# Patient Record
Sex: MALE | Race: WHITE | NOT HISPANIC OR LATINO | Employment: FULL TIME | URBAN - METROPOLITAN AREA
[De-identification: names, ages, dates, MRNs, and addresses within clinical notes are randomized per-mention and may not be internally consistent; named-entity substitution may affect disease eponyms.]

---

## 2017-01-04 ENCOUNTER — OFFICE VISIT (OUTPATIENT)
Dept: NEUROSURGERY | Facility: CLINIC | Age: 37
End: 2017-01-04

## 2017-01-04 VITALS
BODY MASS INDEX: 27.13 KG/M2 | WEIGHT: 179 LBS | DIASTOLIC BLOOD PRESSURE: 64 MMHG | SYSTOLIC BLOOD PRESSURE: 116 MMHG | HEIGHT: 68 IN

## 2017-01-04 DIAGNOSIS — M54.16 LUMBAR RADICULOPATHY: Primary | ICD-10-CM

## 2017-01-04 PROCEDURE — 99213 OFFICE O/P EST LOW 20 MIN: CPT | Performed by: NEUROLOGICAL SURGERY

## 2017-01-04 RX ORDER — GABAPENTIN 300 MG/1
300 CAPSULE ORAL 3 TIMES DAILY
Qty: 120 CAPSULE | Refills: 3 | Status: SHIPPED | OUTPATIENT
Start: 2017-01-04 | End: 2018-05-01

## 2017-01-04 NOTE — PROGRESS NOTES
Subjective   Patient ID: Vince Lynne is a 36 y.o. male who is here today for follow-up for low back pain. He is unaccompanied for this visit today.    History of Present Illness  Patient reports taking the gabapentin since his last visit.  He was able to start jogging last week.  He is pleased with his progress.  No new symptoms or side effects.  Less leg pain.      The following portions of the patient's history were reviewed and updated as appropriate: allergies, current medications, past medical history, past social history, past surgical history and problem list.    Review of Systems   Musculoskeletal: Negative for back pain.   Neurological: Positive for numbness ( LLE ).   Psychiatric/Behavioral: Negative for sleep disturbance.       Objective   Physical Exam  Neurologic Exam   Physical Exam   Neurological:    Reflex Scores:  Patellar reflexes are 1+ on the right side and 1+ on the left side.  Achilles reflexes are 0 on the right side and 0 on the left side.     Neurologic Exam      Motor Exam   Muscle bulk: normal     Strength    Right iliopsoas: 5/5  Left iliopsoas: 5/5  Right quadriceps: 5/5  Left quadriceps: 5/5  Right hamstrin/5  Left hamstrin/5  Right anterior tibial: 5/5  Left anterior tibial: 5/5  Right gastroc: 5/5  Left gastroc: 5/5  ehl is 5/5      Sensory Exam   Right leg light touch: normal  Left leg light touch: normal  Right leg pinprick: normal  Left leg pinprick: normal     Gait, Coordination, and Reflexes      Gait  Gait: (cannot heel walk on the left)     Reflexes   Right patellar: 1+  Left patellar: 1+  Right achilles: 0  Left achilles: 0  Right ankle clonus: absent  Left ankle clonus: absent    Assessment/Plan   Independent Review of Radiographic Studies:    Patient has a small canal congenitally. He has a small left L45 disc bulge and some far lateral component as well.   Medical Decision Making:    He feels much better.  His weakness is improved.  He is trending toward  improvement.  Will continue gabapentin for at least 3 months.  I refilled this today.  I counselled him w/r/t gabapentin and his improvement.  WIll hold on surgery for now.    Vince was seen today for back pain.    Diagnoses and all orders for this visit:    Lumbar radiculopathy    Other orders  -     gabapentin (NEURONTIN) 300 MG capsule; Take 1 capsule by mouth 3 (Three) Times a Day. Take 1 qhs for 3-5 days, then bid for 3-5 days, then tid and stay on this dose    No Follow-up on file.

## 2017-01-04 NOTE — MR AVS SNAPSHOT
Vince Lynne   1/4/2017 3:15 PM   Office Visit    Dept Phone:  [phone]   Encounter #:  [id number]    Provider:  Maximilian Kimbrough IV, MD   Department:  [facility]                Your Full Care Plan              Today's Medication Changes          These changes are accurate as of: 1/4/17  3:53 PM.  If you have any questions, ask your nurse or doctor.               Medication(s)that have changed:     gabapentin 300 MG capsule   Commonly known as:  NEURONTIN   Take 1 capsule by mouth 3 (Three) Times a Day. Take 1 qhs for 3-5 days, then bid for 3-5 days, then tid and stay on this dose   What changed:    - how much to take  - how to take this  - when to take this   Changed by:  Maximilian Kimbrough IV, MD         Stop taking medication(s)listed here:     TYLENOL COLD HEAD CONGESTION 10-5-325 MG tablet   Generic drug:  DM-Phenylephrine-Acetaminophen   Stopped by:  Maximilian Kimbrough IV, MD                Where to Get Your Medications      These medications were sent to [id number] - [place], [place] - [address] - [phone]  - [phone]   [address] [address]     Phone:  [phone]     gabapentin 300 MG capsule                  Your Updated Medication List          This list is accurate as of: 1/4/17  3:53 PM.  Always use your most recent med list.                gabapentin 300 MG capsule   Commonly known as:  NEURONTIN   Take 1 capsule by mouth 3 (Three) Times a Day. Take 1 qhs for 3-5 days, then bid for 3-5 days, then tid and stay on this dose       traZODone 100 MG tablet   Commonly known as:  DESYREL   Take 0.5-2 tabs QHS prn       zolpidem CR 12.5 MG CR tablet   Commonly known as:  AMBIEN CR   Take 1 tablet by mouth At Night As Needed for sleep.               You Were Diagnosed With        Codes Comments    Lumbar radiculopathy    -  Primary ICD-10-CM: M54.16  ICD-9-CM: 724.4       Instructions     None    "Patient Instructions History      Upcoming Appointments     Visit Type Date Time Department    OFFICE VISIT 2017  3:15 PM [facility]    OFFICE VISIT 2/15/2017  8:30 AM [facility]      MyChart Signup     [facility] [employer] allows you to send messages to your doctor, view your test results, renew your prescriptions, schedule appointments, and more. To sign up, go to [employer] and click on the Sign Up Now link in the New User? box. Enter your [employer] Activation Code exactly as it appears below along with the last four digits of your Social Security Number and your Date of Birth () to complete the sign-up process. If you do not sign up before the expiration date, you must request a new code.    [employer] Activation Code: [email or web address]  Expires: 2017  3:51 PM    If you have questions, you can email [email or web address] or call [phone] to talk to our [employer] staff. Remember, [employer] is NOT to be used for urgent needs. For medical emergencies, dial 911.               Other Info from Your Visit           Your Appointments     Feb 15, 2017  8:30 AM EST   Office Visit with Maximilian Kimbrough IV, MD   [facility]OS[facility] (--)    [address]  [facility] [id number] [phone]           Arrive 15 minutes prior to appointment.              Allergies     No Known Allergies      Reason for Visit     Back Pain           Vital Signs     Blood Pressure Height Weight Body Mass Index Smoking Status       116/64 (BP Location: Left arm, Patient Position: Sitting, Cuff Size: Adult) 68\" (172.7 cm) 179 lb (81.2 kg) 27.22 kg/m2 Never Smoker       Problems and Diagnoses Noted     Lumbar nerve root disorder        "

## 2017-02-15 ENCOUNTER — OFFICE VISIT (OUTPATIENT)
Dept: FAMILY MEDICINE CLINIC | Facility: CLINIC | Age: 37
End: 2017-02-15

## 2017-02-15 VITALS
HEART RATE: 67 BPM | SYSTOLIC BLOOD PRESSURE: 107 MMHG | DIASTOLIC BLOOD PRESSURE: 74 MMHG | BODY MASS INDEX: 27.62 KG/M2 | WEIGHT: 176 LBS | HEIGHT: 67 IN | RESPIRATION RATE: 14 BRPM | TEMPERATURE: 98 F

## 2017-02-15 DIAGNOSIS — F51.01 PRIMARY INSOMNIA: ICD-10-CM

## 2017-02-15 PROCEDURE — 99212 OFFICE O/P EST SF 10 MIN: CPT | Performed by: FAMILY MEDICINE

## 2017-02-15 RX ORDER — ZOLPIDEM TARTRATE 12.5 MG/1
12.5 TABLET, FILM COATED, EXTENDED RELEASE ORAL NIGHTLY PRN
Qty: 30 TABLET | Refills: 2
Start: 2017-02-15 | End: 2017-06-21 | Stop reason: SDUPTHER

## 2017-02-15 RX ORDER — TRAZODONE HYDROCHLORIDE 100 MG/1
TABLET ORAL
Qty: 60 TABLET | Refills: 5 | Status: SHIPPED | OUTPATIENT
Start: 2017-02-15 | End: 2017-06-21 | Stop reason: SDUPTHER

## 2017-02-15 NOTE — PROGRESS NOTES
"Subjective   Vince Lynne is a 36 y.o. male.     History of Present Illness     Chief Complaint:   Chief Complaint   Patient presents with   • Insomnia     [facility]       Vince Lynne 36 y.o. male who presents today for Medical Management of the below listed issues and medication refills.  he has a history of   Patient Active Problem List   Diagnosis   • Impaired fasting glucose   • Insomnia   • Irritable bowel syndrome   • Obstructive sleep apnea   • Lumbar radiculopathy   .  Since the last visit, he has overall felt well.  he has been compliant with   Current Outpatient Prescriptions:   •  traZODone (DESYREL) 100 MG tablet, Take 0.5-2 tabs QHS prn, Disp: 60 tablet, Rfl: 5  •  zolpidem CR (AMBIEN CR) 12.5 MG CR tablet, Take 1 tablet by mouth At Night As Needed for sleep., Disp: 30 tablet, Rfl: 2  •  gabapentin (NEURONTIN) 300 MG capsule, Take 1 capsule by mouth 3 (Three) Times a Day. Take 1 qhs for 3-5 days, then bid for 3-5 days, then tid and stay on this dose, Disp: 120 capsule, Rfl: 3.  he denies medication side effects.    All of the chronic condition(s) listed above are stable w/o issues.    Visit Vitals   • /74   • Pulse 67   • Temp 98 °F (36.7 °C) (Oral)   • Resp 14   • Ht 67\" (170.2 cm)   • Wt 176 lb (79.8 kg)   • BMI 27.57 kg/m2       No results found for this or any previous visit.      The following portions of the patient's history were reviewed and updated as appropriate: allergies, current medications, past family history, past medical history, past social history, past surgical history and problem list.    Review of Systems   Constitutional: Negative for activity change, chills, fatigue and fever.   Respiratory: Negative for cough and chest tightness.    Cardiovascular: Negative for chest pain and palpitations.   Gastrointestinal: Negative for abdominal pain and nausea.   Endocrine: Negative for cold intolerance and polydipsia.   Psychiatric/Behavioral: Negative for " behavioral problems and dysphoric mood.   All other systems reviewed and are negative.      Objective   Physical Exam   Constitutional: He appears well-developed and well-nourished.   Neck: Neck supple. No thyromegaly present.   Cardiovascular: Normal rate and regular rhythm.    No murmur heard.  Pulmonary/Chest: Effort normal and breath sounds normal.   Abdominal: Bowel sounds are normal.   Psychiatric: He has a normal mood and affect. His behavior is normal.   Nursing note and vitals reviewed.    The patient has read and signed the [facility] Controlled Substance Contract.  I will continue to see patient for regular follow up appointments.  They are well controlled on their medication.  RITA has been reviewed by me and is updated every 3 months. The patient is aware of the potential for addiction and dependence.    Assessment/Plan   Vince was seen today for insomnia.    Diagnoses and all orders for this visit:    Primary insomnia  -     zolpidem CR (AMBIEN CR) 12.5 MG CR tablet; Take 1 tablet by mouth At Night As Needed for sleep.  -     traZODone (DESYREL) 100 MG tablet; Take 0.5-2 tabs QHS prn

## 2017-06-21 ENCOUNTER — OFFICE VISIT (OUTPATIENT)
Dept: FAMILY MEDICINE CLINIC | Facility: CLINIC | Age: 37
End: 2017-06-21

## 2017-06-21 VITALS
BODY MASS INDEX: 26.37 KG/M2 | DIASTOLIC BLOOD PRESSURE: 68 MMHG | HEART RATE: 68 BPM | WEIGHT: 168 LBS | TEMPERATURE: 98.6 F | RESPIRATION RATE: 16 BRPM | HEIGHT: 67 IN | SYSTOLIC BLOOD PRESSURE: 103 MMHG

## 2017-06-21 DIAGNOSIS — F51.01 PRIMARY INSOMNIA: ICD-10-CM

## 2017-06-21 PROCEDURE — 99212 OFFICE O/P EST SF 10 MIN: CPT | Performed by: FAMILY MEDICINE

## 2017-06-21 RX ORDER — ZOLPIDEM TARTRATE 12.5 MG/1
12.5 TABLET, FILM COATED, EXTENDED RELEASE ORAL NIGHTLY PRN
Qty: 30 TABLET | Refills: 2
Start: 2017-06-21 | End: 2017-06-21 | Stop reason: SDUPTHER

## 2017-06-21 RX ORDER — TRAZODONE HYDROCHLORIDE 100 MG/1
TABLET ORAL
Qty: 60 TABLET | Refills: 5 | Status: SHIPPED | OUTPATIENT
Start: 2017-06-21 | End: 2017-10-31 | Stop reason: SDUPTHER

## 2017-06-21 RX ORDER — ZOLPIDEM TARTRATE 12.5 MG/1
12.5 TABLET, FILM COATED, EXTENDED RELEASE ORAL NIGHTLY PRN
Qty: 30 TABLET | Refills: 2 | Status: SHIPPED | OUTPATIENT
Start: 2017-06-21 | End: 2017-10-31 | Stop reason: SDUPTHER

## 2017-06-21 NOTE — PROGRESS NOTES
"Subjective   Vince Lynne is a 37 y.o. male.     History of Present Illness     Chief Complaint:   Chief Complaint   Patient presents with   • Insomnia     MED [facility] - [facility]       Vince Lynne 37 y.o. male who presents today for Medical Management of the below listed issues and medication refills.  he has a history of   Patient Active Problem List   Diagnosis   • Impaired fasting glucose   • Insomnia   • Irritable bowel syndrome   • Obstructive sleep apnea   • Lumbar radiculopathy   .  Since the last visit, he has overall felt well.  he has been compliant with   Current Outpatient Prescriptions:   •  traZODone (DESYREL) 100 MG tablet, Take 0.5-2 tabs QHS prn, Disp: 60 tablet, Rfl: 5  •  zolpidem CR (AMBIEN CR) 12.5 MG CR tablet, Take 1 tablet by mouth At Night As Needed for Sleep., Disp: 30 tablet, Rfl: 2  •  gabapentin (NEURONTIN) 300 MG capsule, Take 1 capsule by mouth 3 (Three) Times a Day. Take 1 qhs for 3-5 days, then bid for 3-5 days, then tid and stay on this dose, Disp: 120 capsule, Rfl: 3.  he denies medication side effects.    All of the chronic condition(s) listed above are stable w/o issues.    /68  Pulse 68  Temp 98.6 °F (37 °C) (Oral)   Resp 16  Ht 67\" (170.2 cm)  Wt 168 lb (76.2 kg)  BMI 26.31 kg/m2    No results found for this or any previous visit.      The following portions of the patient's history were reviewed and updated as appropriate: allergies, current medications, past family history, past medical history, past social history, past surgical history and problem list.    Review of Systems   Constitutional: Negative for activity change, chills, fatigue and fever.   Respiratory: Negative for cough and shortness of breath.    Cardiovascular: Negative for chest pain and palpitations.   Gastrointestinal: Negative for abdominal pain.   Endocrine: Negative for cold intolerance.   Psychiatric/Behavioral: Negative for behavioral problems and dysphoric mood. The patient is not " nervous/anxious.        Objective   Physical Exam   Constitutional: He appears well-developed and well-nourished.   Neck: Neck supple. No thyromegaly present.   Cardiovascular: Normal rate and regular rhythm.    No murmur heard.  Pulmonary/Chest: Effort normal and breath sounds normal.   Abdominal: Bowel sounds are normal.   Psychiatric: He has a normal mood and affect. His behavior is normal.   Nursing note and vitals reviewed.    The patient has read and signed the [facility] Controlled Substance Contract.  I will continue to see patient for regular follow up appointments.  They are well controlled on their medication.  RITA has been reviewed by me and is updated every 3 months. The patient is aware of the potential for addiction and dependence.    Assessment/Plan   Vince was seen today for insomnia.    Diagnoses and all orders for this visit:    Primary insomnia  -     Discontinue: zolpidem CR (AMBIEN CR) 12.5 MG CR tablet; Take 1 tablet by mouth At Night As Needed for Sleep.  -     zolpidem CR (AMBIEN CR) 12.5 MG CR tablet; Take 1 tablet by mouth At Night As Needed for Sleep.  -     traZODone (DESYREL) 100 MG tablet; Take 0.5-2 tabs QHS prn

## 2017-10-31 ENCOUNTER — OFFICE VISIT (OUTPATIENT)
Dept: FAMILY MEDICINE CLINIC | Facility: CLINIC | Age: 37
End: 2017-10-31

## 2017-10-31 VITALS
DIASTOLIC BLOOD PRESSURE: 72 MMHG | TEMPERATURE: 97.9 F | WEIGHT: 173 LBS | RESPIRATION RATE: 16 BRPM | SYSTOLIC BLOOD PRESSURE: 107 MMHG | HEIGHT: 67 IN | BODY MASS INDEX: 27.15 KG/M2 | HEART RATE: 62 BPM

## 2017-10-31 DIAGNOSIS — F51.01 PRIMARY INSOMNIA: ICD-10-CM

## 2017-10-31 PROCEDURE — 99212 OFFICE O/P EST SF 10 MIN: CPT | Performed by: FAMILY MEDICINE

## 2017-10-31 RX ORDER — ZOLPIDEM TARTRATE 12.5 MG/1
12.5 TABLET, FILM COATED, EXTENDED RELEASE ORAL NIGHTLY PRN
Qty: 30 TABLET | Refills: 2 | Status: SHIPPED | OUTPATIENT
Start: 2017-10-31 | End: 2018-02-22 | Stop reason: SDUPTHER

## 2017-10-31 RX ORDER — TRAZODONE HYDROCHLORIDE 100 MG/1
TABLET ORAL
Qty: 60 TABLET | Refills: 5 | Status: SHIPPED | OUTPATIENT
Start: 2017-10-31 | End: 2018-02-22 | Stop reason: SDUPTHER

## 2017-10-31 NOTE — PROGRESS NOTES
"Subjective   Vince Lynne is a 37 y.o. male.     History of Present Illness     Chief Complaint:   Chief Complaint   Patient presents with   • Insomnia     [facility]       Vince Lynne 37 y.o. male who presents today for Medical Management of the below listed issues and medication refills.  he has a problem list of   Patient Active Problem List   Diagnosis   • Impaired fasting glucose   • Insomnia   • Irritable bowel syndrome   • Obstructive sleep apnea   • Lumbar radiculopathy   .  Since the last visit, he has overall felt well.  he has been compliant with   Current Outpatient Prescriptions:   •  traZODone (DESYREL) 100 MG tablet, Take 0.5-2 tabs QHS prn, Disp: 60 tablet, Rfl: 5  •  zolpidem CR (AMBIEN CR) 12.5 MG CR tablet, Take 1 tablet by mouth At Night As Needed for Sleep., Disp: 30 tablet, Rfl: 2  •  gabapentin (NEURONTIN) 300 MG capsule, Take 1 capsule by mouth 3 (Three) Times a Day. Take 1 qhs for 3-5 days, then bid for 3-5 days, then tid and stay on this dose, Disp: 120 capsule, Rfl: 3.  he denies medication side effects.    All of the chronic condition(s) listed above are stable w/o issues.    /72  Pulse 62  Temp 97.9 °F (36.6 °C) (Oral)   Resp 16  Ht 67\" (170.2 cm)  Wt 173 lb (78.5 kg)  BMI 27.1 kg/m2    No results found for this or any previous visit.    The following portions of the patient's history were reviewed and updated as appropriate: allergies, current medications, past family history, past medical history, past social history, past surgical history and problem list.    Review of Systems   Constitutional: Negative for activity change, chills, fatigue and fever.   Respiratory: Negative for cough and shortness of breath.    Cardiovascular: Negative for chest pain and palpitations.   Gastrointestinal: Negative for abdominal pain.   Endocrine: Negative for cold intolerance.   Psychiatric/Behavioral: Negative for behavioral problems and dysphoric mood. The patient is " not nervous/anxious.        Objective   Physical Exam   Constitutional: He appears well-developed and well-nourished.   Neck: Neck supple. No thyromegaly present.   Cardiovascular: Normal rate and regular rhythm.    No murmur heard.  Pulmonary/Chest: Effort normal and breath sounds normal.   Abdominal: Bowel sounds are normal.   Psychiatric: He has a normal mood and affect. His behavior is normal.   Nursing note and vitals reviewed.    The patient has read and signed the [facility] Controlled Substance Contract.  I will continue to see patient for regular follow up appointments.  They are well controlled on their medication.  RITA has been reviewed by me and is updated every 3 months. The patient is aware of the potential for addiction and dependence.    Assessment/Plan   Vince was seen today for insomnia.    Diagnoses and all orders for this visit:    Primary insomnia  -     zolpidem CR (AMBIEN CR) 12.5 MG CR tablet; Take 1 tablet by mouth At Night As Needed for Sleep.  -     traZODone (DESYREL) 100 MG tablet; Take 0.5-2 tabs QHS prn

## 2018-02-22 ENCOUNTER — OFFICE VISIT (OUTPATIENT)
Dept: FAMILY MEDICINE CLINIC | Facility: CLINIC | Age: 38
End: 2018-02-22

## 2018-02-22 VITALS
BODY MASS INDEX: 26.68 KG/M2 | WEIGHT: 170 LBS | HEART RATE: 78 BPM | DIASTOLIC BLOOD PRESSURE: 77 MMHG | HEIGHT: 67 IN | RESPIRATION RATE: 16 BRPM | SYSTOLIC BLOOD PRESSURE: 110 MMHG | TEMPERATURE: 98.9 F

## 2018-02-22 DIAGNOSIS — F51.01 PRIMARY INSOMNIA: ICD-10-CM

## 2018-02-22 PROCEDURE — 99212 OFFICE O/P EST SF 10 MIN: CPT | Performed by: FAMILY MEDICINE

## 2018-02-22 RX ORDER — TRAZODONE HYDROCHLORIDE 100 MG/1
TABLET ORAL
Qty: 60 TABLET | Refills: 5 | Status: SHIPPED | OUTPATIENT
Start: 2018-02-22 | End: 2018-06-26 | Stop reason: SDUPTHER

## 2018-02-22 RX ORDER — ZOLPIDEM TARTRATE 12.5 MG/1
12.5 TABLET, FILM COATED, EXTENDED RELEASE ORAL NIGHTLY PRN
Qty: 30 TABLET | Refills: 2 | Status: SHIPPED | OUTPATIENT
Start: 2018-02-22 | End: 2018-06-26 | Stop reason: SDUPTHER

## 2018-02-22 NOTE — PROGRESS NOTES
"Subjective   Vince Lynne is a 37 y.o. male.     History of Present Illness     Chief Complaint:   Chief Complaint   Patient presents with   • Insomnia     MED REFILL - RITA        Vince Lynne 37 y.o. male who presents today for Medical Management of the below listed issues and medication refills.  he has a problem list of   Patient Active Problem List   Diagnosis   • Impaired fasting glucose   • Insomnia   • Irritable bowel syndrome   • Obstructive sleep apnea   • Lumbar radiculopathy   .  Since the last visit, he has overall felt well.  he has been compliant with   Current Outpatient Prescriptions:   •  traZODone (DESYREL) 100 MG tablet, Take 0.5-2 tabs QHS prn, Disp: 60 tablet, Rfl: 5  •  zolpidem CR (AMBIEN CR) 12.5 MG CR tablet, Take 1 tablet by mouth At Night As Needed for Sleep., Disp: 30 tablet, Rfl: 2  •  gabapentin (NEURONTIN) 300 MG capsule, Take 1 capsule by mouth 3 (Three) Times a Day. Take 1 qhs for 3-5 days, then bid for 3-5 days, then tid and stay on this dose, Disp: 120 capsule, Rfl: 3.  he denies medication side effects.    All of the chronic condition(s) listed above are stable w/o issues.    /77  Pulse 78  Temp 98.9 °F (37.2 °C) (Oral)   Resp 16  Ht 170.2 cm (67\")  Wt 77.1 kg (170 lb)  BMI 26.63 kg/m2    No results found for this or any previous visit.        The following portions of the patient's history were reviewed and updated as appropriate: allergies, current medications, past family history, past medical history, past social history, past surgical history and problem list.    Review of Systems   Constitutional: Negative for activity change, chills, fatigue and fever.   Respiratory: Negative for cough and shortness of breath.    Cardiovascular: Negative for chest pain and palpitations.   Gastrointestinal: Negative for abdominal pain.   Endocrine: Negative for cold intolerance.   Psychiatric/Behavioral: Negative for behavioral problems and dysphoric mood. The patient " is not nervous/anxious.        Objective   Physical Exam   Constitutional: He appears well-developed and well-nourished.   Neck: Neck supple. No thyromegaly present.   Cardiovascular: Normal rate and regular rhythm.    No murmur heard.  Pulmonary/Chest: Effort normal and breath sounds normal.   Abdominal: Bowel sounds are normal.   Psychiatric: He has a normal mood and affect. His behavior is normal.   Nursing note and vitals reviewed.    The patient has read and signed the [facility] Controlled Substance Contract.  I will continue to see patient for regular follow up appointments.  They are well controlled on their medication.  RITA has been reviewed by me and is updated every 3 months. The patient is aware of the potential for addiction and dependence.    Assessment/Plan   Vince was seen today for insomnia.    Diagnoses and all orders for this visit:    Primary insomnia  -     zolpidem CR (AMBIEN CR) 12.5 MG CR tablet; Take 1 tablet by mouth At Night As Needed for Sleep.  -     traZODone (DESYREL) 100 MG tablet; Take 0.5-2 tabs QHS prn

## 2018-05-01 ENCOUNTER — OFFICE VISIT (OUTPATIENT)
Dept: FAMILY MEDICINE CLINIC | Facility: CLINIC | Age: 38
End: 2018-05-01

## 2018-05-01 VITALS
HEART RATE: 68 BPM | SYSTOLIC BLOOD PRESSURE: 111 MMHG | DIASTOLIC BLOOD PRESSURE: 72 MMHG | TEMPERATURE: 98.2 F | WEIGHT: 169 LBS | BODY MASS INDEX: 26.53 KG/M2 | RESPIRATION RATE: 16 BRPM | HEIGHT: 67 IN

## 2018-05-01 DIAGNOSIS — K40.90 INGUINAL HERNIA OF LEFT SIDE WITHOUT OBSTRUCTION OR GANGRENE: Primary | ICD-10-CM

## 2018-05-01 PROCEDURE — 99213 OFFICE O/P EST LOW 20 MIN: CPT | Performed by: FAMILY MEDICINE

## 2018-05-01 NOTE — PROGRESS NOTES
"Subjective   Vince Lynne is a 37 y.o. male.     CC: Discharge from the Umbilicus          Groin Nodule    History of Present Illness     Pt comes in today c/o two things. First, he has some d/c from his umbilicus and secondly, has found a lump in the groin x 2 weeks. Denies pain with the left groin lump. No f/c. The umbilical d/c has resolved at this time and there is a slight protrusion there.      The following portions of the patient's history were reviewed and updated as appropriate: allergies, current medications, past family history, past medical history, past social history, past surgical history and problem list.    Review of Systems   Constitutional: Negative for activity change, chills, fatigue and fever.   Respiratory: Negative for cough and shortness of breath.    Cardiovascular: Negative for chest pain and palpitations.   Gastrointestinal: Negative for abdominal pain.        Umbilical d/c   Endocrine: Negative for cold intolerance.   Genitourinary:        Lump   Psychiatric/Behavioral: Negative for behavioral problems and dysphoric mood. The patient is not nervous/anxious.      /72   Pulse 68   Temp 98.2 °F (36.8 °C) (Oral)   Resp 16   Ht 170.2 cm (67\")   Wt 76.7 kg (169 lb)   BMI 26.47 kg/m²     Objective   Physical Exam   Constitutional: He appears well-developed and well-nourished.   Neck: Neck supple. No thyromegaly present.   Cardiovascular: Normal rate and regular rhythm.    No murmur heard.  Pulmonary/Chest: Effort normal and breath sounds normal.   Abdominal: Bowel sounds are normal. A hernia is present. Hernia confirmed positive in the left inguinal area (in canal and reducible).   Umbilicus WNL today.   Psychiatric: He has a normal mood and affect. His behavior is normal.   Nursing note and vitals reviewed.      Assessment/Plan   Vince was seen today for other and other.    Diagnoses and all orders for this visit:    Inguinal hernia of left side without obstruction or " gangrene  -     Ambulatory Referral to General Surgery

## 2018-05-23 ENCOUNTER — PREP FOR SURGERY (OUTPATIENT)
Dept: OTHER | Facility: HOSPITAL | Age: 38
End: 2018-05-23

## 2018-05-23 ENCOUNTER — OFFICE VISIT (OUTPATIENT)
Dept: SURGERY | Facility: CLINIC | Age: 38
End: 2018-05-23

## 2018-05-23 VITALS — HEART RATE: 58 BPM | HEIGHT: 67 IN | OXYGEN SATURATION: 95 % | WEIGHT: 169 LBS | BODY MASS INDEX: 26.53 KG/M2

## 2018-05-23 DIAGNOSIS — G47.33 OBSTRUCTIVE SLEEP APNEA: Primary | ICD-10-CM

## 2018-05-23 DIAGNOSIS — K40.90 LEFT INGUINAL HERNIA: ICD-10-CM

## 2018-05-23 DIAGNOSIS — K40.90 LEFT INGUINAL HERNIA: Primary | ICD-10-CM

## 2018-05-23 PROCEDURE — 99243 OFF/OP CNSLTJ NEW/EST LOW 30: CPT | Performed by: SURGERY

## 2018-05-23 RX ORDER — OXYCODONE HCL 10 MG/1
10 TABLET, FILM COATED, EXTENDED RELEASE ORAL ONCE
Status: CANCELLED | OUTPATIENT
Start: 2018-07-19 | End: 2018-07-19

## 2018-05-23 RX ORDER — ACETAMINOPHEN 325 MG/1
650 TABLET ORAL ONCE
Status: CANCELLED | OUTPATIENT
Start: 2018-07-19 | End: 2018-07-19

## 2018-05-23 RX ORDER — CELECOXIB 200 MG/1
200 CAPSULE ORAL ONCE
Status: CANCELLED | OUTPATIENT
Start: 2018-07-19 | End: 2018-07-19

## 2018-05-23 RX ORDER — CEFAZOLIN SODIUM 2 G/100ML
2 INJECTION, SOLUTION INTRAVENOUS ONCE
Status: CANCELLED | OUTPATIENT
Start: 2018-07-19 | End: 2018-07-19

## 2018-05-23 NOTE — PROGRESS NOTES
"SURGERY  Vince Lynne   1980 05/23/18    Chief Complaint:  Left inguinal hernia    HPI    Patient is a very pleasant 37 y.o. male who is referred by Dr. Harry Ortiz with a history of a golf ball-sized bump appearing in the left groin region.  He says there is actually no pain associated with it.  He is still working out, weight lifting routinely, Mondays Wednesdays Fridays, using dumbbell weights of up to 100 pounds.  He also has a 40 pound dog who can get up on the bed named \"Janice\", a mixed debridement, we is lifting to the bed and thinks that might be complicating the issue.  It's more protuberant when lifting, better when recumbent.    As far as his activity postoperatively, he is a  and when beating heavy lifting and would like to get back to work as soon as possible.  Of course he is interested in getting back to his workout routine.    He denies any testicular problems or urinary problems.  He does have sleep apnea, but hasn't used his CPAP for about 6 months.  I've asked that he get that operative and bring it the day of surgery.    Past Medical History:   Diagnosis Date   • Anxiety    • H/O complete eye exam due   • Impaired fasting glucose 06/30/2014   • Insomnia    • Irritable bowel syndrome 10/25/2011   • Obstructive sleep apnea 09/2015     Past Surgical History:   Procedure Laterality Date   • COLONOSCOPY N/A 02/17/2014    Normal ileum, normal colon, non-bleeding internal hemorrhoids-Dr. Edy Oviedo   • KNEE CARTILAGE SURGERY  2006   • UPPER GASTROINTESTINAL ENDOSCOPY N/A 02/17/2014    LA Grade B reflux esophagitis, normal stomach, normal examined duodenum-Dr. Edy Oviedo     Family History   Problem Relation Age of Onset   • Heart attack Mother    • Diabetes Father      Social History     Social History   • Marital status: Single     Spouse name: N/A   • Number of children: N/A   • Years of education: N/A     Occupational History   •       Social History " "Main Topics   • Smoking status: Never Smoker   • Smokeless tobacco: Former User   • Alcohol use 12.0 oz/week     20 Cans of beer per week   • Drug use: No   • Sexual activity: Defer     Other Topics Concern   • Not on file     Social History Narrative   • No narrative on file     Occupation/Additional Social Hx:       Current Outpatient Prescriptions:   •  Multiple Vitamin (MULTI-VITAMIN DAILY PO), Take  by mouth., Disp: , Rfl:   •  traZODone (DESYREL) 100 MG tablet, Take 0.5-2 tabs QHS prn, Disp: 60 tablet, Rfl: 5  •  zolpidem CR (AMBIEN CR) 12.5 MG CR tablet, Take 1 tablet by mouth At Night As Needed for Sleep., Disp: 30 tablet, Rfl: 2    No Known Allergies  Preventative Medicine  Colonoscopy: 2012  Review of Systems   HENT: Positive for rhinorrhea and sneezing.    All other systems reviewed and are negative.      Vitals:    05/23/18 1510   Pulse: 58   SpO2: 95%   Weight: 76.7 kg (169 lb)   Height: 170.2 cm (67\")       PHYSICAL EXAM:    Pulse 58   Ht 170.2 cm (67\")   Wt 76.7 kg (169 lb)   SpO2 95%   BMI 26.47 kg/m²   Body mass index is 26.47 kg/m².    Constitutional: well developed, well nourished, appears stated age  Eyes: sclera nonicteric, conjunctiva not injected   ENMT: Hearing intact, trachea midline, thyroid without masses  CVS: Bradycardic with regular rhythm, no murmur, peripheral edema not present  Respiratory: CTA, normal respiratory effort   Gastrointestinal: no hepatosplenomegaly, abdomen soft, nontender, abdominal hernia not present, incisional scars not present  Genitourinary: inguinal hernia visible at the left ring, about 3 cm, palpable, with the suggestion of one on the right side closer to the internal ring  Musculoskeletal: gait normal, muscle mass normal  Skin: warm and dry, no rashes visible  Neurological: awake and alert, seems to have reasonable capacity for understanding for medical decision making  Psychiatric: appears to have reasonable judgement, pleasant  Lymphatics: " no cervical adenopathy     Radiographic Findings: None    Lab Findings: None    Pamphlet reviewed: Inguinal hernia    IMPRESSION:  · Left inguinal hernia, with aggressive workout routine, with very possible right inguinal as well  · Sleep apnea, with intermittent compliance of CPAP  · Insomnia and anxiety, with trazodone and zolpidem use    PLAN:  · Laparoscopic left inguinal hernia repair, with possible right.  Discussed the risk and benefits including bleeding, infection, potential recurrence, testicular complications  · Bring CPAP the day of the procedure.  I discussed with him the particular reason that it's importance around the time of general anesthetic, where loss of airway can be more prone.  · Discussed in detail his return to activities, particularly in the context of his return to work, which I agreed he could go in a week if he wanted, the need to abstain from lifting his dog, as he is not to lift anything over 20 pounds for about 2 weeks.  He can get back to heavy work out with 100 pounds for 6 weeks.  Intermittent activities such as running could be started at 3 weeks.    Kylah Alvarez MD  05/23/18  6:15 PM

## 2018-06-26 ENCOUNTER — OFFICE VISIT (OUTPATIENT)
Dept: FAMILY MEDICINE CLINIC | Facility: CLINIC | Age: 38
End: 2018-06-26

## 2018-06-26 VITALS
HEART RATE: 66 BPM | WEIGHT: 167 LBS | RESPIRATION RATE: 14 BRPM | DIASTOLIC BLOOD PRESSURE: 77 MMHG | BODY MASS INDEX: 26.21 KG/M2 | TEMPERATURE: 97.9 F | HEIGHT: 67 IN | SYSTOLIC BLOOD PRESSURE: 118 MMHG

## 2018-06-26 DIAGNOSIS — F51.01 PRIMARY INSOMNIA: ICD-10-CM

## 2018-06-26 PROCEDURE — 99212 OFFICE O/P EST SF 10 MIN: CPT | Performed by: FAMILY MEDICINE

## 2018-06-26 RX ORDER — TRAZODONE HYDROCHLORIDE 100 MG/1
TABLET ORAL
Qty: 60 TABLET | Refills: 5 | Status: SHIPPED | OUTPATIENT
Start: 2018-06-26 | End: 2018-11-01 | Stop reason: SDUPTHER

## 2018-06-26 RX ORDER — ZOLPIDEM TARTRATE 12.5 MG/1
12.5 TABLET, FILM COATED, EXTENDED RELEASE ORAL NIGHTLY PRN
Qty: 30 TABLET | Refills: 2 | Status: SHIPPED | OUTPATIENT
Start: 2018-06-26 | End: 2018-11-01 | Stop reason: SDUPTHER

## 2018-06-26 NOTE — PROGRESS NOTES
"Subjective   Vince Lynne is a 38 y.o. male.     History of Present Illness     Chief Complaint:   Chief Complaint   Patient presents with   • Insomnia     med refill  rosemary       Vince Lynne 38 y.o. male who presents today for Medical Management of the below listed issues and medication refills.  he has a problem list of   Patient Active Problem List   Diagnosis   • Impaired fasting glucose   • Insomnia   • Irritable bowel syndrome   • Obstructive sleep apnea   • Lumbar radiculopathy   • Left inguinal hernia   .  Since the last visit, he has overall felt well.  he has been compliant with   Current Outpatient Prescriptions:   •  traZODone (DESYREL) 100 MG tablet, Take 0.5-2 tabs QHS prn, Disp: 60 tablet, Rfl: 5  •  zolpidem CR (AMBIEN CR) 12.5 MG CR tablet, Take 1 tablet by mouth At Night As Needed for Sleep., Disp: 30 tablet, Rfl: 2  •  Multiple Vitamin (MULTI-VITAMIN DAILY PO), Take  by mouth., Disp: , Rfl: .  he denies medication side effects.    All of the chronic condition(s) listed above are stable w/o issues.    /77   Pulse 66   Temp 97.9 °F (36.6 °C) (Oral)   Resp 14   Ht 170.2 cm (67\")   Wt 75.8 kg (167 lb)   BMI 26.16 kg/m²     No results found for this or any previous visit.        The following portions of the patient's history were reviewed and updated as appropriate: allergies, current medications, past family history, past medical history, past social history, past surgical history and problem list.    Review of Systems   Constitutional: Negative for activity change, chills, fatigue and fever.   Respiratory: Negative for cough and shortness of breath.    Cardiovascular: Negative for chest pain and palpitations.   Gastrointestinal: Negative for abdominal pain.   Endocrine: Negative for cold intolerance.   Psychiatric/Behavioral: Negative for behavioral problems and dysphoric mood. The patient is not nervous/anxious.        Objective   Physical Exam   Constitutional: He appears " well-developed and well-nourished.   Neck: Neck supple. No thyromegaly present.   Cardiovascular: Normal rate and regular rhythm.    No murmur heard.  Pulmonary/Chest: Effort normal and breath sounds normal.   Abdominal: Bowel sounds are normal. There is no tenderness.   Psychiatric: He has a normal mood and affect. His behavior is normal.   Nursing note and vitals reviewed.    The patient has read and signed the [facility] Controlled Substance Contract.  I will continue to see patient for regular follow up appointments.  They are well controlled on their medication.  RITA has been reviewed by me and is updated every 3 months. The patient is aware of the potential for addiction and dependence.    Assessment/Plan   Vince was seen today for insomnia.    Diagnoses and all orders for this visit:    Primary insomnia  -     zolpidem CR (AMBIEN CR) 12.5 MG CR tablet; Take 1 tablet by mouth At Night As Needed for Sleep.  -     traZODone (DESYREL) 100 MG tablet; Take 0.5-2 tabs QHS prn

## 2018-07-13 ENCOUNTER — APPOINTMENT (OUTPATIENT)
Dept: PREADMISSION TESTING | Facility: HOSPITAL | Age: 38
End: 2018-07-13

## 2018-07-13 VITALS
OXYGEN SATURATION: 99 % | DIASTOLIC BLOOD PRESSURE: 72 MMHG | HEART RATE: 77 BPM | TEMPERATURE: 97.2 F | RESPIRATION RATE: 18 BRPM | HEIGHT: 67 IN | BODY MASS INDEX: 26.06 KG/M2 | SYSTOLIC BLOOD PRESSURE: 114 MMHG | WEIGHT: 166 LBS

## 2018-07-13 LAB
ANION GAP SERPL CALCULATED.3IONS-SCNC: 14.6 MMOL/L
BUN BLD-MCNC: 22 MG/DL (ref 6–20)
BUN/CREAT SERPL: 18.3 (ref 7–25)
CALCIUM SPEC-SCNC: 9.7 MG/DL (ref 8.6–10.5)
CHLORIDE SERPL-SCNC: 103 MMOL/L (ref 98–107)
CO2 SERPL-SCNC: 24.4 MMOL/L (ref 22–29)
CREAT BLD-MCNC: 1.2 MG/DL (ref 0.76–1.27)
DEPRECATED RDW RBC AUTO: 47 FL (ref 37–54)
ERYTHROCYTE [DISTWIDTH] IN BLOOD BY AUTOMATED COUNT: 13.3 % (ref 11.5–14.5)
GFR SERPL CREATININE-BSD FRML MDRD: 67 ML/MIN/1.73
GLUCOSE BLD-MCNC: 136 MG/DL (ref 65–99)
HCT VFR BLD AUTO: 40.4 % (ref 40.4–52.2)
HGB BLD-MCNC: 13.8 G/DL (ref 13.7–17.6)
MCH RBC QN AUTO: 33.1 PG (ref 27–32.7)
MCHC RBC AUTO-ENTMCNC: 34.2 G/DL (ref 32.6–36.4)
MCV RBC AUTO: 96.9 FL (ref 79.8–96.2)
PLATELET # BLD AUTO: 154 10*3/MM3 (ref 140–500)
PMV BLD AUTO: 11.4 FL (ref 6–12)
POTASSIUM BLD-SCNC: 4.3 MMOL/L (ref 3.5–5.2)
RBC # BLD AUTO: 4.17 10*6/MM3 (ref 4.6–6)
SODIUM BLD-SCNC: 142 MMOL/L (ref 136–145)
WBC NRBC COR # BLD: 5.13 10*3/MM3 (ref 4.5–10.7)

## 2018-07-13 PROCEDURE — 93005 ELECTROCARDIOGRAM TRACING: CPT

## 2018-07-13 PROCEDURE — 80048 BASIC METABOLIC PNL TOTAL CA: CPT | Performed by: SURGERY

## 2018-07-13 PROCEDURE — 85027 COMPLETE CBC AUTOMATED: CPT | Performed by: SURGERY

## 2018-07-13 PROCEDURE — 93010 ELECTROCARDIOGRAM REPORT: CPT | Performed by: INTERNAL MEDICINE

## 2018-07-13 PROCEDURE — 36415 COLL VENOUS BLD VENIPUNCTURE: CPT

## 2018-07-19 ENCOUNTER — HOSPITAL ENCOUNTER (OUTPATIENT)
Facility: HOSPITAL | Age: 38
Setting detail: HOSPITAL OUTPATIENT SURGERY
Discharge: HOME | End: 2018-07-19
Attending: SURGERY | Admitting: SURGERY

## 2018-07-19 ENCOUNTER — ANESTHESIA EVENT (OUTPATIENT)
Dept: PERIOP | Facility: HOSPITAL | Age: 38
End: 2018-07-19

## 2018-07-19 ENCOUNTER — ANESTHESIA (OUTPATIENT)
Dept: PERIOP | Facility: HOSPITAL | Age: 38
End: 2018-07-19

## 2018-07-19 VITALS
TEMPERATURE: 97.7 F | RESPIRATION RATE: 18 BRPM | SYSTOLIC BLOOD PRESSURE: 112 MMHG | DIASTOLIC BLOOD PRESSURE: 64 MMHG | HEART RATE: 68 BPM | OXYGEN SATURATION: 96 %

## 2018-07-19 DIAGNOSIS — K40.90 LEFT INGUINAL HERNIA: ICD-10-CM

## 2018-07-19 PROCEDURE — 25010000002 MIDAZOLAM PER 1 MG: Performed by: ANESTHESIOLOGY

## 2018-07-19 PROCEDURE — 25010000002 DEXAMETHASONE PER 1 MG: Performed by: NURSE ANESTHETIST, CERTIFIED REGISTERED

## 2018-07-19 PROCEDURE — C1781 MESH (IMPLANTABLE): HCPCS | Performed by: SURGERY

## 2018-07-19 PROCEDURE — 25010000002 FENTANYL CITRATE (PF) 100 MCG/2ML SOLUTION: Performed by: NURSE ANESTHETIST, CERTIFIED REGISTERED

## 2018-07-19 PROCEDURE — 25010000002 ONDANSETRON PER 1 MG: Performed by: NURSE ANESTHETIST, CERTIFIED REGISTERED

## 2018-07-19 PROCEDURE — 25010000002 PROPOFOL 10 MG/ML EMULSION: Performed by: NURSE ANESTHETIST, CERTIFIED REGISTERED

## 2018-07-19 PROCEDURE — 49650 LAP ING HERNIA REPAIR INIT: CPT | Performed by: SURGERY

## 2018-07-19 PROCEDURE — 25010000002 PHENYLEPHRINE PER 1 ML: Performed by: NURSE ANESTHETIST, CERTIFIED REGISTERED

## 2018-07-19 PROCEDURE — 25010000003 CEFAZOLIN IN DEXTROSE 2-4 GM/100ML-% SOLUTION: Performed by: SURGERY

## 2018-07-19 DEVICE — BARD 3DMAX MESH RIGHT LARGE
Type: IMPLANTABLE DEVICE | Status: FUNCTIONAL
Brand: BARD 3DMAX MESH

## 2018-07-19 DEVICE — BARD 3DMAX MESH LEFT LARGE
Type: IMPLANTABLE DEVICE | Status: FUNCTIONAL
Brand: BARD 3DMAX MESH

## 2018-07-19 RX ORDER — HYDROMORPHONE HYDROCHLORIDE 1 MG/ML
0.5 INJECTION, SOLUTION INTRAMUSCULAR; INTRAVENOUS; SUBCUTANEOUS
Status: DISCONTINUED | OUTPATIENT
Start: 2018-07-19 | End: 2018-07-19 | Stop reason: HOSPADM

## 2018-07-19 RX ORDER — DEXAMETHASONE SODIUM PHOSPHATE 4 MG/ML
INJECTION, SOLUTION INTRA-ARTICULAR; INTRALESIONAL; INTRAMUSCULAR; INTRAVENOUS; SOFT TISSUE AS NEEDED
Status: DISCONTINUED | OUTPATIENT
Start: 2018-07-19 | End: 2018-07-19 | Stop reason: SURG

## 2018-07-19 RX ORDER — FENTANYL CITRATE 50 UG/ML
INJECTION, SOLUTION INTRAMUSCULAR; INTRAVENOUS AS NEEDED
Status: DISCONTINUED | OUTPATIENT
Start: 2018-07-19 | End: 2018-07-19 | Stop reason: SURG

## 2018-07-19 RX ORDER — FENTANYL CITRATE 50 UG/ML
50 INJECTION, SOLUTION INTRAMUSCULAR; INTRAVENOUS
Status: DISCONTINUED | OUTPATIENT
Start: 2018-07-19 | End: 2018-07-19 | Stop reason: HOSPADM

## 2018-07-19 RX ORDER — OXYCODONE AND ACETAMINOPHEN 7.5; 325 MG/1; MG/1
1 TABLET ORAL ONCE AS NEEDED
Status: COMPLETED | OUTPATIENT
Start: 2018-07-19 | End: 2018-07-19

## 2018-07-19 RX ORDER — MIDAZOLAM HYDROCHLORIDE 1 MG/ML
1 INJECTION INTRAMUSCULAR; INTRAVENOUS
Status: DISCONTINUED | OUTPATIENT
Start: 2018-07-19 | End: 2018-07-19 | Stop reason: HOSPADM

## 2018-07-19 RX ORDER — EPHEDRINE SULFATE 50 MG/ML
5 INJECTION, SOLUTION INTRAVENOUS ONCE AS NEEDED
Status: DISCONTINUED | OUTPATIENT
Start: 2018-07-19 | End: 2018-07-19 | Stop reason: HOSPADM

## 2018-07-19 RX ORDER — CELECOXIB 200 MG/1
200 CAPSULE ORAL ONCE
Status: COMPLETED | OUTPATIENT
Start: 2018-07-19 | End: 2018-07-19

## 2018-07-19 RX ORDER — LIDOCAINE HYDROCHLORIDE 20 MG/ML
INJECTION, SOLUTION INFILTRATION; PERINEURAL AS NEEDED
Status: DISCONTINUED | OUTPATIENT
Start: 2018-07-19 | End: 2018-07-19 | Stop reason: SURG

## 2018-07-19 RX ORDER — BUPIVACAINE HYDROCHLORIDE AND EPINEPHRINE 5; 5 MG/ML; UG/ML
INJECTION, SOLUTION PERINEURAL AS NEEDED
Status: DISCONTINUED | OUTPATIENT
Start: 2018-07-19 | End: 2018-07-19 | Stop reason: HOSPADM

## 2018-07-19 RX ORDER — ROCURONIUM BROMIDE 10 MG/ML
INJECTION, SOLUTION INTRAVENOUS AS NEEDED
Status: DISCONTINUED | OUTPATIENT
Start: 2018-07-19 | End: 2018-07-19 | Stop reason: SURG

## 2018-07-19 RX ORDER — PROMETHAZINE HYDROCHLORIDE 25 MG/1
12.5 TABLET ORAL ONCE AS NEEDED
Status: DISCONTINUED | OUTPATIENT
Start: 2018-07-19 | End: 2018-07-19 | Stop reason: HOSPADM

## 2018-07-19 RX ORDER — PROPOFOL 10 MG/ML
VIAL (ML) INTRAVENOUS AS NEEDED
Status: DISCONTINUED | OUTPATIENT
Start: 2018-07-19 | End: 2018-07-19 | Stop reason: SURG

## 2018-07-19 RX ORDER — CEFAZOLIN SODIUM 2 G/100ML
2 INJECTION, SOLUTION INTRAVENOUS ONCE
Status: COMPLETED | OUTPATIENT
Start: 2018-07-19 | End: 2018-07-19

## 2018-07-19 RX ORDER — ONDANSETRON 2 MG/ML
4 INJECTION INTRAMUSCULAR; INTRAVENOUS ONCE AS NEEDED
Status: DISCONTINUED | OUTPATIENT
Start: 2018-07-19 | End: 2018-07-19 | Stop reason: HOSPADM

## 2018-07-19 RX ORDER — PROMETHAZINE HYDROCHLORIDE 25 MG/1
25 SUPPOSITORY RECTAL ONCE AS NEEDED
Status: DISCONTINUED | OUTPATIENT
Start: 2018-07-19 | End: 2018-07-19 | Stop reason: HOSPADM

## 2018-07-19 RX ORDER — ONDANSETRON 4 MG/1
4 TABLET, FILM COATED ORAL EVERY 8 HOURS PRN
Qty: 20 TABLET | Refills: 0 | Status: SHIPPED | OUTPATIENT
Start: 2018-07-19 | End: 2018-07-26

## 2018-07-19 RX ORDER — DIPHENHYDRAMINE HYDROCHLORIDE 50 MG/ML
12.5 INJECTION INTRAMUSCULAR; INTRAVENOUS
Status: DISCONTINUED | OUTPATIENT
Start: 2018-07-19 | End: 2018-07-19 | Stop reason: HOSPADM

## 2018-07-19 RX ORDER — GLYCOPYRROLATE 0.2 MG/ML
INJECTION INTRAMUSCULAR; INTRAVENOUS AS NEEDED
Status: DISCONTINUED | OUTPATIENT
Start: 2018-07-19 | End: 2018-07-19 | Stop reason: SURG

## 2018-07-19 RX ORDER — PROMETHAZINE HYDROCHLORIDE 25 MG/1
25 TABLET ORAL ONCE AS NEEDED
Status: DISCONTINUED | OUTPATIENT
Start: 2018-07-19 | End: 2018-07-19 | Stop reason: HOSPADM

## 2018-07-19 RX ORDER — OXYCODONE HCL 10 MG/1
10 TABLET, FILM COATED, EXTENDED RELEASE ORAL ONCE
Status: COMPLETED | OUTPATIENT
Start: 2018-07-19 | End: 2018-07-19

## 2018-07-19 RX ORDER — LABETALOL HYDROCHLORIDE 5 MG/ML
5 INJECTION, SOLUTION INTRAVENOUS
Status: DISCONTINUED | OUTPATIENT
Start: 2018-07-19 | End: 2018-07-19 | Stop reason: HOSPADM

## 2018-07-19 RX ORDER — OXYCODONE HYDROCHLORIDE AND ACETAMINOPHEN 5; 325 MG/1; MG/1
TABLET ORAL
Qty: 14 TABLET | Refills: 0 | Status: SHIPPED | OUTPATIENT
Start: 2018-07-19 | End: 2018-07-26

## 2018-07-19 RX ORDER — ONDANSETRON 2 MG/ML
INJECTION INTRAMUSCULAR; INTRAVENOUS AS NEEDED
Status: DISCONTINUED | OUTPATIENT
Start: 2018-07-19 | End: 2018-07-19 | Stop reason: SURG

## 2018-07-19 RX ORDER — PROMETHAZINE HYDROCHLORIDE 25 MG/ML
12.5 INJECTION, SOLUTION INTRAMUSCULAR; INTRAVENOUS ONCE AS NEEDED
Status: DISCONTINUED | OUTPATIENT
Start: 2018-07-19 | End: 2018-07-19 | Stop reason: HOSPADM

## 2018-07-19 RX ORDER — LIDOCAINE HYDROCHLORIDE 10 MG/ML
0.5 INJECTION, SOLUTION EPIDURAL; INFILTRATION; INTRACAUDAL; PERINEURAL ONCE AS NEEDED
Status: DISCONTINUED | OUTPATIENT
Start: 2018-07-19 | End: 2018-07-19 | Stop reason: HOSPADM

## 2018-07-19 RX ORDER — ACETAMINOPHEN 325 MG/1
650 TABLET ORAL ONCE
Status: COMPLETED | OUTPATIENT
Start: 2018-07-19 | End: 2018-07-19

## 2018-07-19 RX ORDER — MIDAZOLAM HYDROCHLORIDE 1 MG/ML
2 INJECTION INTRAMUSCULAR; INTRAVENOUS
Status: DISCONTINUED | OUTPATIENT
Start: 2018-07-19 | End: 2018-07-19 | Stop reason: HOSPADM

## 2018-07-19 RX ORDER — EPHEDRINE SULFATE 50 MG/ML
INJECTION, SOLUTION INTRAVENOUS AS NEEDED
Status: DISCONTINUED | OUTPATIENT
Start: 2018-07-19 | End: 2018-07-19 | Stop reason: SURG

## 2018-07-19 RX ORDER — FLUMAZENIL 0.1 MG/ML
0.2 INJECTION INTRAVENOUS AS NEEDED
Status: DISCONTINUED | OUTPATIENT
Start: 2018-07-19 | End: 2018-07-19 | Stop reason: HOSPADM

## 2018-07-19 RX ORDER — FAMOTIDINE 10 MG/ML
20 INJECTION, SOLUTION INTRAVENOUS ONCE
Status: COMPLETED | OUTPATIENT
Start: 2018-07-19 | End: 2018-07-19

## 2018-07-19 RX ORDER — HYDROCODONE BITARTRATE AND ACETAMINOPHEN 7.5; 325 MG/1; MG/1
1 TABLET ORAL ONCE AS NEEDED
Status: DISCONTINUED | OUTPATIENT
Start: 2018-07-19 | End: 2018-07-19 | Stop reason: HOSPADM

## 2018-07-19 RX ORDER — NALOXONE HCL 0.4 MG/ML
0.2 VIAL (ML) INJECTION AS NEEDED
Status: DISCONTINUED | OUTPATIENT
Start: 2018-07-19 | End: 2018-07-19 | Stop reason: HOSPADM

## 2018-07-19 RX ORDER — SODIUM CHLORIDE, SODIUM LACTATE, POTASSIUM CHLORIDE, CALCIUM CHLORIDE 600; 310; 30; 20 MG/100ML; MG/100ML; MG/100ML; MG/100ML
9 INJECTION, SOLUTION INTRAVENOUS CONTINUOUS
Status: DISCONTINUED | OUTPATIENT
Start: 2018-07-19 | End: 2018-07-19 | Stop reason: HOSPADM

## 2018-07-19 RX ORDER — SODIUM CHLORIDE 0.9 % (FLUSH) 0.9 %
1-10 SYRINGE (ML) INJECTION AS NEEDED
Status: DISCONTINUED | OUTPATIENT
Start: 2018-07-19 | End: 2018-07-19 | Stop reason: HOSPADM

## 2018-07-19 RX ADMIN — DEXAMETHASONE SODIUM PHOSPHATE 6 MG: 4 INJECTION, SOLUTION INTRAMUSCULAR; INTRAVENOUS at 07:45

## 2018-07-19 RX ADMIN — OXYCODONE HYDROCHLORIDE 10 MG: 10 TABLET, FILM COATED, EXTENDED RELEASE ORAL at 06:10

## 2018-07-19 RX ADMIN — OXYCODONE HYDROCHLORIDE AND ACETAMINOPHEN 1 TABLET: 7.5; 325 TABLET ORAL at 09:03

## 2018-07-19 RX ADMIN — SODIUM CHLORIDE, POTASSIUM CHLORIDE, SODIUM LACTATE AND CALCIUM CHLORIDE: 600; 310; 30; 20 INJECTION, SOLUTION INTRAVENOUS at 07:35

## 2018-07-19 RX ADMIN — EPHEDRINE SULFATE 10 MG: 50 INJECTION INTRAMUSCULAR; INTRAVENOUS; SUBCUTANEOUS at 08:10

## 2018-07-19 RX ADMIN — LIDOCAINE HYDROCHLORIDE 100 MG: 20 INJECTION, SOLUTION INFILTRATION; PERINEURAL at 07:40

## 2018-07-19 RX ADMIN — ROCURONIUM BROMIDE 50 MG: 10 INJECTION INTRAVENOUS at 07:40

## 2018-07-19 RX ADMIN — GLYCOPYRROLATE 0.1 MG: 0.2 INJECTION INTRAMUSCULAR; INTRAVENOUS at 07:40

## 2018-07-19 RX ADMIN — FENTANYL CITRATE 50 MCG: 50 INJECTION INTRAMUSCULAR; INTRAVENOUS at 07:40

## 2018-07-19 RX ADMIN — PROPOFOL 200 MG: 10 INJECTION, EMULSION INTRAVENOUS at 07:40

## 2018-07-19 RX ADMIN — ROCURONIUM BROMIDE 10 MG: 10 INJECTION INTRAVENOUS at 08:30

## 2018-07-19 RX ADMIN — GLYCOPYRROLATE 0.1 MG: 0.2 INJECTION INTRAMUSCULAR; INTRAVENOUS at 07:50

## 2018-07-19 RX ADMIN — SUGAMMADEX 200 MG: 100 INJECTION, SOLUTION INTRAVENOUS at 08:47

## 2018-07-19 RX ADMIN — CEFAZOLIN SODIUM 2 G: 2 INJECTION, SOLUTION INTRAVENOUS at 07:40

## 2018-07-19 RX ADMIN — CELECOXIB 200 MG: 200 CAPSULE ORAL at 06:10

## 2018-07-19 RX ADMIN — ONDANSETRON 4 MG: 2 INJECTION INTRAMUSCULAR; INTRAVENOUS at 08:45

## 2018-07-19 RX ADMIN — FENTANYL CITRATE 50 MCG: 50 INJECTION, SOLUTION INTRAMUSCULAR; INTRAVENOUS at 09:17

## 2018-07-19 RX ADMIN — FAMOTIDINE 20 MG: 10 INJECTION INTRAVENOUS at 06:57

## 2018-07-19 RX ADMIN — EPHEDRINE SULFATE 10 MG: 50 INJECTION INTRAMUSCULAR; INTRAVENOUS; SUBCUTANEOUS at 07:57

## 2018-07-19 RX ADMIN — MIDAZOLAM 2 MG: 1 INJECTION INTRAMUSCULAR; INTRAVENOUS at 06:57

## 2018-07-19 RX ADMIN — ACETAMINOPHEN 650 MG: 325 TABLET ORAL at 06:10

## 2018-07-19 RX ADMIN — FENTANYL CITRATE 50 MCG: 50 INJECTION INTRAMUSCULAR; INTRAVENOUS at 08:30

## 2018-07-19 RX ADMIN — FENTANYL CITRATE 50 MCG: 50 INJECTION, SOLUTION INTRAMUSCULAR; INTRAVENOUS at 09:01

## 2018-07-19 RX ADMIN — PHENYLEPHRINE HYDROCHLORIDE 100 MCG: 10 INJECTION INTRAVENOUS at 07:50

## 2018-07-19 NOTE — OP NOTE
SURGERY  Operative Note :  KIM  Hernia Repair    Vince Lynne  1980    Procedure Date: 07/19/18    Pre-op Diagnosis:   · Left inguinal hernia, with aggressive workout routine, with very possible right inguinal as well    Post-op Diagnosis:  · Bilateral inguinal hernia, indirect    Procedure:   · Laparoscopic bilateral inguinal hernia repair, totally extraperitoneal    Surgeon: Kim    Assistant: Rita    EBL: Less than 15 cc    Specimens:   · None    Indications:  · Nice young man who has a very active lifestyle with a very aggressive workout routine who comes in with a left inguinal hernia, with possibly a right as well    Associated issues:  · Sleep apnea, with intermittent compliance of CPAP  · Insomnia and anxiety, with trazodone and zolpidem use    Findings:   · Large left inguinal hernia, with peritoneal sac extending out into the inguinal canal at least 5 cm  · Smaller right indirect inguinal hernia    Recommendations:   · Routine recuperation and recovery  · Very close attention to avoiding has active workout routine during the postoperative period, with my recommendations be that he not return to his full activity for at least 6 weeks postop    Technique:     Gen. anesthetic was induced, IV abx (kefzol) given, abdomen prepped with Hibiclens and draped sterilely.  There was no Cronin catheter placed.  SCD garments were in place.    Incision was made below the umbilicus, transversely, with an 11 blade, beginning at the midline and extending to the left side.  S retractors were used to dissect through the subcutaneous fatty tissue to the fascia, which was then opened with an 11 blade from the midline out laterally.  S retractors were placed in order to pull the muscle out laterally from the midline, and then the second S retractor placed posterior to the muscle and used to slide down into the extraperitoneal space to begin dissection in that plain.    Then the moistened extrawide autosuture  dissecting balloon was placed thru that opening, in the medial aspect, posterior to the muscle in the extraperitoneal space down to the pubic bone.  It was then insufflated under direct vision.  There was obvious visualization of the epigastric vessels coursing superiorly,  good recognition of the pubic tubercle and the pelvic bone.  The dissection carried out without undue bleeding.  Dissection carried out by the balloon was very good and extended to the right side, but minimally    We then exchanged the dissecting balloon for a 10 mm balloon trocar, and insufflated the space with 12 mmHg.  Two additional trochars were placed below that in the midline, 5 mm each.    We began dissection at the pubic tubercle and extended out laterally, using the blunt dissectors with fairly straightforward dissection, the peritoneum being fairly adherent posteriorly, but extending out into the inguinal canal.   There was not a direct component.  Dissection of the peritoneal reflection posteriorly was carried out, and extended up very far superiorly, particularly in the context of his very active lifestyle  The junction of the epigastric vessels and the iliac vessels dissected out well.  The pelvic space ultimately was completed with hemo-locks across a couple of small strands that were tethering the peritoneum posteriorly and divided with the cautery    I then went to the opposite side, and we dissected out the peritoneal reflection on the right, that side being without any dramatic amount of sac extending into the canal, but with a defect present.  He will locks were not needed on the right side, but I did have to use the cautery to divide some strands that were tethering to the posterior aspect of the peritoneum.    3-D contour mesh was selected, large size, placed through the 10 mm trocar site, arranged and secured with 4 tacks along the pubic tubercle and pelvic bone and then 2 out laterally.  None were used superiorly or  anteriorly.    3-D contour mesh was selected, the large size, and placed through the 10 mm trocar site, and oriented.  The mesh was then secured at the pubic tubercle and out laterally, with 5 absorbatacs along the pelvic bone.  Laterally, along the musculature, 3 additional tacs were placed, taking care to make sure that the peritoneum was pulled superior and lateral to all edges of the mesh.  The mesh laid extremely well, with the peritoneum clearly superior to that, and thus upon release of insufflation, falling down into the mesh like a sling..    All trochars were removed.  The 10 mm rectus fascial opening was closed with a 0 Vicryl figure-of-eight, and the skin with 5-0 Vicryl's, skin affix.    Kylah Alvarez MD  07/19/18  8:56 AM  Thursday

## 2018-07-19 NOTE — ANESTHESIA PROCEDURE NOTES
Airway  Urgency: elective    Date/Time: 7/19/2018 7:43 AM  Airway not difficult    General Information and Staff    Patient location during procedure: OR  Anesthesiologist: OBED GREY  CRNA: BRANDEN MENDEZ I    Indications and Patient Condition  Indications for airway management: airway protection    Preoxygenated: yes  MILS maintained throughout  Mask difficulty assessment: 2 - vent by mask + OA or adjuvant +/- NMBA    Final Airway Details  Final airway type: endotracheal airway      Successful airway: ETT  Cuffed: yes   Successful intubation technique: direct laryngoscopy  Facilitating devices/methods: intubating stylet  Endotracheal tube insertion site: oral  Blade: Nga  Blade size: #4  ETT size: 7.5 mm  Cormack-Lehane Classification: grade IIa - partial view of glottis  Placement verified by: chest auscultation and capnometry   Cuff volume (mL): 7  Measured from: lips  ETT to lips (cm): 21  Number of attempts at approach: 1    Additional Comments  Patient in OR. Monitors on. BLVS. Pre 02 100%. SIVI. DL x1. DVVC. Atraumatic placement of ETT. Placement verified with ETC02 and BBS. ETT secured. Teeth/lips in pre-op condition.

## 2018-07-19 NOTE — H&P
"Chief Complaint:  Left inguinal hernia     HPI    Patient here for left inguinal hernia repair, possible right.  His history is as listed below.     Patient is a very pleasant 37 y.o. male who is referred by Dr. Harry Ortiz with a history of a golf ball-sized bump appearing in the left groin region.  He says there is actually no pain associated with it.  He is still working out, weight lifting routinely, Mondays Wednesdays Fridays, using dumbbell weights of up to 100 pounds.  He also has a 40 pound dog who can get up on the bed named \"Janice\", a mixed debridement, we is lifting to the bed and thinks that might be complicating the issue.  It's more protuberant when lifting, better when recumbent.     As far as his activity postoperatively, he is a  and when beating heavy lifting and would like to get back to work as soon as possible.  Of course he is interested in getting back to his workout routine.     He denies any testicular problems or urinary problems.  He does have sleep apnea, but hasn't used his CPAP for about 6 months.  I've asked that he get that operative and bring it the day of surgery.     Medical History        Past Medical History:   Diagnosis Date   • Anxiety     • H/O complete eye exam due   • Impaired fasting glucose 06/30/2014   • Insomnia     • Irritable bowel syndrome 10/25/2011   • Obstructive sleep apnea 09/2015         Surgical History         Past Surgical History:   Procedure Laterality Date   • COLONOSCOPY N/A 02/17/2014     Normal ileum, normal colon, non-bleeding internal hemorrhoids-Dr. Edy Oviedo   • KNEE CARTILAGE SURGERY   2006   • UPPER GASTROINTESTINAL ENDOSCOPY N/A 02/17/2014     LA Grade B reflux esophagitis, normal stomach, normal examined duodenum-Dr. Edy Oviedo               Family History   Problem Relation Age of Onset   • Heart attack Mother     • Diabetes Father        Social History   Social History            Social History   • Marital status: " "Single       Spouse name: N/A   • Number of children: N/A   • Years of education: N/A           Occupational History   •               Social History Main Topics   • Smoking status: Never Smoker   • Smokeless tobacco: Former User   • Alcohol use 12.0 oz/week       20 Cans of beer per week   • Drug use: No   • Sexual activity: Defer           Other Topics Concern   • Not on file          Social History Narrative   • No narrative on file         Occupation/Additional Social Hx:         Current Outpatient Prescriptions:   •  Multiple Vitamin (MULTI-VITAMIN DAILY PO), Take  by mouth., Disp: , Rfl:   •  traZODone (DESYREL) 100 MG tablet, Take 0.5-2 tabs QHS prn, Disp: 60 tablet, Rfl: 5  •  zolpidem CR (AMBIEN CR) 12.5 MG CR tablet, Take 1 tablet by mouth At Night As Needed for Sleep., Disp: 30 tablet, Rfl: 2     No Known Allergies  Preventative Medicine  Colonoscopy: 2012  Review of Systems   HENT: Positive for rhinorrhea and sneezing.    All other systems reviewed and are negative.        Vitals       Vitals:     05/23/18 1510   Pulse: 58   SpO2: 95%   Weight: 76.7 kg (169 lb)   Height: 170.2 cm (67\")            PHYSICAL EXAM:     Pulse 58   Ht 170.2 cm (67\")   Wt 76.7 kg (169 lb)   SpO2 95%   BMI 26.47 kg/m²   Body mass index is 26.47 kg/m².     Constitutional: well developed, well nourished, appears stated age  Eyes: sclera nonicteric, conjunctiva not injected   ENMT: Hearing intact, trachea midline, thyroid without masses  CVS: Bradycardic with regular rhythm, no murmur, peripheral edema not present  Respiratory: CTA, normal respiratory effort   Gastrointestinal: no hepatosplenomegaly, abdomen soft, nontender, abdominal hernia not present, incisional scars not present  Genitourinary: inguinal hernia visible at the left ring, about 3 cm, palpable, with the suggestion of one on the right side closer to the internal ring  Musculoskeletal: gait normal, muscle mass normal  Skin: warm and " dry, no rashes visible  Neurological: awake and alert, seems to have reasonable capacity for understanding for medical decision making  Psychiatric: appears to have reasonable judgement, pleasant  Lymphatics: no cervical adenopathy      Radiographic Findings: None     Lab Findings: None     Pamphlet reviewed: Inguinal hernia     IMPRESSION:  · Left inguinal hernia, with aggressive workout routine, with very possible right inguinal as well  · Sleep apnea, with intermittent compliance of CPAP  · Insomnia and anxiety, with trazodone and zolpidem use     PLAN:  · Laparoscopic left inguinal hernia repair, with possible right.  Discussed the risk and benefits including bleeding, infection, potential recurrence, testicular complications  · Bring CPAP the day of the procedure.  I discussed with him the particular reason that it's importance around the time of general anesthetic, where loss of airway can be more prone.  · Discussed in detail his return to activities, particularly in the context of his return to work, which I agreed he could go in a week if he wanted, the need to abstain from lifting his dog, as he is not to lift anything over 20 pounds for about 2 weeks.  He can get back to heavy work out with 100 pounds for 6 weeks.  Intermittent activities such as running could be started at 3 weeks.     Kylah Alvarez MD

## 2018-07-19 NOTE — ANESTHESIA POSTPROCEDURE EVALUATION
Patient: Vince Lynne    Procedure Summary     Date:  07/19/18 Room / Location:   [facility]  [facility]  [facility]    Anesthesia Start:  0735 Anesthesia Stop:  0856    Procedure:  BILATERAL INGUINAL HERNIA REPAIR LAPAROSCOPIC (Left Abdomen) Diagnosis:       Left inguinal hernia      (Left inguinal hernia [K40.90])    Surgeon:  Kylah Alvarez MD Provider:  Lily Roman MD    Anesthesia Type:  general ASA Status:  2          Anesthesia Type: general  Last vitals  BP   112/64 (07/19/18 0938)   Temp   36.5 °C (97.7 °F) (07/19/18 0855)   Pulse   68 (07/19/18 0938)   Resp   18 (07/19/18 0938)     SpO2   96 % (07/19/18 0938)     Post Anesthesia Care and Evaluation    Patient location during evaluation: PACU  Patient participation: complete - patient participated  Level of consciousness: awake and alert  Pain management: adequate  Airway patency: patent  Anesthetic complications: No anesthetic complications    Cardiovascular status: acceptable  Respiratory status: acceptable  Hydration status: acceptable

## 2018-07-19 NOTE — ANESTHESIA PREPROCEDURE EVALUATION
Anesthesia Evaluation     Patient summary reviewed and Nursing notes reviewed   NPO Solid Status: > 8 hours  NPO Liquid Status: > 8 hours           Airway   Mallampati: II  TM distance: >3 FB  Neck ROM: full  no difficulty expected  Dental - normal exam     Pulmonary - normal exam   (+) sleep apnea,   Cardiovascular - negative cardio ROS and normal exam  Exercise tolerance: good (4-7 METS)    Rhythm: regular  Rate: normal        Neuro/Psych  (+) psychiatric history Anxiety,     GI/Hepatic/Renal/Endo      Musculoskeletal (-) negative ROS    Abdominal  - normal exam   Substance History - negative use     OB/GYN          Other - negative ROS                       Anesthesia Plan    ASA 2     general     intravenous induction   Anesthetic plan and risks discussed with patient.

## 2018-07-26 ENCOUNTER — OFFICE VISIT (OUTPATIENT)
Dept: SURGERY | Facility: CLINIC | Age: 38
End: 2018-07-26

## 2018-07-26 DIAGNOSIS — Z98.890 HISTORY OF BILATERAL INGUINAL HERNIA REPAIR: Primary | ICD-10-CM

## 2018-07-26 DIAGNOSIS — Z87.19 HISTORY OF BILATERAL INGUINAL HERNIA REPAIR: Primary | ICD-10-CM

## 2018-07-26 PROCEDURE — 99024 POSTOP FOLLOW-UP VISIT: CPT | Performed by: PHYSICIAN ASSISTANT

## 2018-07-26 NOTE — PROGRESS NOTES
Post-operative Visit Note:    Vince Lynne is a 38 y.o. male who is status-post bilateral inguinal hernia repair on 7/19/2018 with Dr. Alvarez.     He reports that he is doing well. On exam his incisions are healing well. He states that he discontinued his pain medication on Sunday and has had no discomfort since Tuesday. He engaged in some light activity with walking today and said that he felt some soreness, probably a 1-2 pain, but did not require any NSAIDs or other pain medication, and resolved spontaneously.     He asked about being able to exercise. It was advised that he not engage in anything strenuous for another month at least, but walking on the treadmill is ok. He asked about push-ups and was told that should be fine, but to be mindful to any discomfort or straining while performing the exercises and to discontinue if he feels any discomfort.     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     No further follow-up is needed for him at this time, but he may call with any questions or concerns should they arise.

## 2018-11-01 ENCOUNTER — OFFICE VISIT (OUTPATIENT)
Dept: FAMILY MEDICINE CLINIC | Facility: CLINIC | Age: 38
End: 2018-11-01

## 2018-11-01 VITALS
SYSTOLIC BLOOD PRESSURE: 112 MMHG | WEIGHT: 166 LBS | HEART RATE: 68 BPM | HEIGHT: 67 IN | RESPIRATION RATE: 14 BRPM | BODY MASS INDEX: 26.06 KG/M2 | TEMPERATURE: 98.1 F | DIASTOLIC BLOOD PRESSURE: 74 MMHG

## 2018-11-01 DIAGNOSIS — M75.101 ROTATOR CUFF SYNDROME OF RIGHT SHOULDER: ICD-10-CM

## 2018-11-01 DIAGNOSIS — F51.01 PRIMARY INSOMNIA: Primary | ICD-10-CM

## 2018-11-01 PROCEDURE — 99213 OFFICE O/P EST LOW 20 MIN: CPT | Performed by: FAMILY MEDICINE

## 2018-11-01 RX ORDER — TRAZODONE HYDROCHLORIDE 100 MG/1
TABLET ORAL
Qty: 60 TABLET | Refills: 5 | Status: SHIPPED | OUTPATIENT
Start: 2018-11-01 | End: 2019-07-15

## 2018-11-01 RX ORDER — ZOLPIDEM TARTRATE 12.5 MG/1
12.5 TABLET, FILM COATED, EXTENDED RELEASE ORAL NIGHTLY PRN
Qty: 30 TABLET | Refills: 2 | Status: SHIPPED | OUTPATIENT
Start: 2018-11-01 | End: 2019-07-15

## 2018-11-01 RX ORDER — DICLOFENAC SODIUM 75 MG/1
75 TABLET, DELAYED RELEASE ORAL 2 TIMES DAILY
Qty: 60 TABLET | Refills: 1 | Status: SHIPPED | OUTPATIENT
Start: 2018-11-01 | End: 2019-12-18 | Stop reason: SDUPTHER

## 2018-11-01 NOTE — PROGRESS NOTES
"Subjective   Vince Lynne is a 38 y.o. male.     History of Present Illness     Chief Complaint:   Chief Complaint   Patient presents with   • Insomnia     med refill rosemary        Vince Lynne 38 y.o. male who presents today for Medical Management of the below listed issues and medication refills.  he has a problem list of   Patient Active Problem List   Diagnosis   • Impaired fasting glucose   • Insomnia   • Irritable bowel syndrome   • Obstructive sleep apnea   • Lumbar radiculopathy   • Left inguinal hernia   .  Since the last visit, he has overall felt well.  he has been compliant with   Current Outpatient Prescriptions:   •  diclofenac (VOLTAREN) 75 MG EC tablet, Take 1 tablet by mouth 2 (Two) Times a Day., Disp: 60 tablet, Rfl: 1  •  Multiple Vitamin (MULTI-VITAMIN DAILY PO), Take 1 tablet/day by mouth Daily. PT HOLDING FOR SURGERY, Disp: , Rfl:   •  traZODone (DESYREL) 100 MG tablet, Take 0.5-2 tabs QHS prn, Disp: 60 tablet, Rfl: 5  •  zolpidem CR (AMBIEN CR) 12.5 MG CR tablet, Take 1 tablet by mouth At Night As Needed for Sleep., Disp: 30 tablet, Rfl: 2.  he denies medication side effects.    All of the chronic condition(s) listed above are stable w/o issues.    /74   Pulse 68   Temp 98.1 °F (36.7 °C) (Oral)   Resp 14   Ht 170.2 cm (67\")   Wt 75.3 kg (166 lb)   BMI 26.00 kg/m²     Results for orders placed or performed in visit on 07/13/18   Basic Metabolic Panel   Result Value Ref Range    Glucose 136 (H) 65 - 99 mg/dL    BUN 22 (H) 6 - 20 mg/dL    Creatinine 1.20 0.76 - 1.27 mg/dL    Sodium 142 136 - 145 mmol/L    Potassium 4.3 3.5 - 5.2 mmol/L    Chloride 103 98 - 107 mmol/L    CO2 24.4 22.0 - 29.0 mmol/L    Calcium 9.7 8.6 - 10.5 mg/dL    eGFR Non African Amer 67 >60 mL/min/1.73    BUN/Creatinine Ratio 18.3 7.0 - 25.0    Anion Gap 14.6 mmol/L   CBC (No Diff)   Result Value Ref Range    WBC 5.13 4.50 - 10.70 10*3/mm3    RBC 4.17 (L) 4.60 - 6.00 10*6/mm3    Hemoglobin 13.8 13.7 - 17.6 " g/dL    Hematocrit 40.4 40.4 - 52.2 %    MCV 96.9 (H) 79.8 - 96.2 fL    MCH 33.1 (H) 27.0 - 32.7 pg    MCHC 34.2 32.6 - 36.4 g/dL    RDW 13.3 11.5 - 14.5 %    RDW-SD 47.0 37.0 - 54.0 fl    MPV 11.4 6.0 - 12.0 fL    Platelets 154 140 - 500 10*3/mm3           The following portions of the patient's history were reviewed and updated as appropriate: allergies, current medications, past family history, past medical history, past social history, past surgical history and problem list.    Review of Systems   Constitutional: Negative for activity change, chills, fatigue and fever.   Respiratory: Negative for cough and shortness of breath.    Cardiovascular: Negative for chest pain and palpitations.   Gastrointestinal: Negative for abdominal pain.   Endocrine: Negative for cold intolerance.   Psychiatric/Behavioral: Negative for behavioral problems and dysphoric mood. The patient is not nervous/anxious.        Objective   Physical Exam   Constitutional: He appears well-developed and well-nourished.   Neck: Neck supple. No thyromegaly present.   Cardiovascular: Normal rate and regular rhythm.    No murmur heard.  Pulmonary/Chest: Effort normal and breath sounds normal.   Abdominal: Bowel sounds are normal. There is no tenderness.   Psychiatric: He has a normal mood and affect. His behavior is normal.   Nursing note and vitals reviewed.    The patient has read and signed the [facility] Controlled Substance Contract.  I will continue to see patient for regular follow up appointments.  They are well controlled on their medication.  RITA has been reviewed by me and is updated every 3 months. The patient is aware of the potential for addiction and dependence.    Assessment/Plan   Vince was seen today for insomnia.    Diagnoses and all orders for this visit:    Primary insomnia  -     zolpidem CR (AMBIEN CR) 12.5 MG CR tablet; Take 1 tablet by mouth At Night As Needed for Sleep.  -     traZODone (DESYREL) 100 MG tablet; Take  0.5-2 tabs QHS prn    Rotator cuff syndrome of right shoulder  -     diclofenac (VOLTAREN) 75 MG EC tablet; Take 1 tablet by mouth 2 (Two) Times a Day.

## 2019-07-15 ENCOUNTER — OFFICE VISIT (OUTPATIENT)
Dept: FAMILY MEDICINE CLINIC | Facility: CLINIC | Age: 39
End: 2019-07-15

## 2019-07-15 VITALS
HEART RATE: 72 BPM | DIASTOLIC BLOOD PRESSURE: 83 MMHG | HEIGHT: 67 IN | WEIGHT: 172 LBS | TEMPERATURE: 98.7 F | BODY MASS INDEX: 27 KG/M2 | RESPIRATION RATE: 16 BRPM | SYSTOLIC BLOOD PRESSURE: 130 MMHG

## 2019-07-15 DIAGNOSIS — F51.01 PRIMARY INSOMNIA: Primary | ICD-10-CM

## 2019-07-15 DIAGNOSIS — R73.01 IMPAIRED FASTING GLUCOSE: ICD-10-CM

## 2019-07-15 DIAGNOSIS — Z00.00 ANNUAL PHYSICAL EXAM: ICD-10-CM

## 2019-07-15 PROCEDURE — 99213 OFFICE O/P EST LOW 20 MIN: CPT | Performed by: FAMILY MEDICINE

## 2019-07-15 RX ORDER — ZOLPIDEM TARTRATE 12.5 MG/1
12.5 TABLET, FILM COATED, EXTENDED RELEASE ORAL NIGHTLY PRN
Qty: 30 TABLET | Refills: 2 | Status: CANCELLED | OUTPATIENT
Start: 2019-07-15

## 2019-07-15 RX ORDER — ESZOPICLONE 3 MG/1
3 TABLET, FILM COATED ORAL NIGHTLY
Qty: 30 TABLET | Refills: 2 | Status: SHIPPED | OUTPATIENT
Start: 2019-07-15 | End: 2019-10-17

## 2019-07-15 RX ORDER — TRAZODONE HYDROCHLORIDE 100 MG/1
TABLET ORAL
Qty: 60 TABLET | Refills: 5 | Status: CANCELLED | OUTPATIENT
Start: 2019-07-15

## 2019-07-15 NOTE — PROGRESS NOTES
"Subjective   Vince Lynne is a 39 y.o. male.     History of Present Illness     Chief Complaint:   Chief Complaint   Patient presents with   • Insomnia     med refill - rosemary  - to discuss changing meds        Vince Lynne 39 y.o. male who presents today for Medical Management of the below listed issues and medication refills.  he has a problem list of   Patient Active Problem List   Diagnosis   • Impaired fasting glucose   • Insomnia   • Irritable bowel syndrome   • Obstructive sleep apnea   • Lumbar radiculopathy   • Left inguinal hernia   .  Since the last visit, he has overall felt well. Does report some wear-off effect with the prior Ambien and wishes to try something else. he has been compliant with   Current Outpatient Medications:   •  diclofenac (VOLTAREN) 75 MG EC tablet, Take 1 tablet by mouth 2 (Two) Times a Day., Disp: 60 tablet, Rfl: 1  •  eszopiclone (LUNESTA) 3 MG tablet, Take 1 tablet by mouth Every Night. Take immediately before bedtime, Disp: 30 tablet, Rfl: 2  •  Multiple Vitamin (MULTI-VITAMIN DAILY PO), Take 1 tablet/day by mouth Daily. PT HOLDING FOR SURGERY, Disp: , Rfl: .  he denies medication side effects.    All of the chronic condition(s) listed above are stable w/o issues.    /83   Pulse 72   Temp 98.7 °F (37.1 °C) (Oral)   Resp 16   Ht 170.2 cm (67\")   Wt 78 kg (172 lb)   BMI 26.94 kg/m²     Results for orders placed or performed in visit on 07/13/18   Basic Metabolic Panel   Result Value Ref Range    Glucose 136 (H) 65 - 99 mg/dL    BUN 22 (H) 6 - 20 mg/dL    Creatinine 1.20 0.76 - 1.27 mg/dL    Sodium 142 136 - 145 mmol/L    Potassium 4.3 3.5 - 5.2 mmol/L    Chloride 103 98 - 107 mmol/L    CO2 24.4 22.0 - 29.0 mmol/L    Calcium 9.7 8.6 - 10.5 mg/dL    eGFR Non African Amer 67 >60 mL/min/1.73    BUN/Creatinine Ratio 18.3 7.0 - 25.0    Anion Gap 14.6 mmol/L   CBC (No Diff)   Result Value Ref Range    WBC 5.13 4.50 - 10.70 10*3/mm3    RBC 4.17 (L) 4.60 - 6.00 " 10*6/mm3    Hemoglobin 13.8 13.7 - 17.6 g/dL    Hematocrit 40.4 40.4 - 52.2 %    MCV 96.9 (H) 79.8 - 96.2 fL    MCH 33.1 (H) 27.0 - 32.7 pg    MCHC 34.2 32.6 - 36.4 g/dL    RDW 13.3 11.5 - 14.5 %    RDW-SD 47.0 37.0 - 54.0 fl    MPV 11.4 6.0 - 12.0 fL    Platelets 154 140 - 500 10*3/mm3           The following portions of the patient's history were reviewed and updated as appropriate: allergies, current medications, past family history, past medical history, past social history, past surgical history and problem list.    Review of Systems   Constitutional: Negative for activity change, chills, fatigue and fever.   Respiratory: Negative for cough and shortness of breath.    Cardiovascular: Negative for chest pain and palpitations.   Gastrointestinal: Negative for abdominal pain.   Endocrine: Negative for cold intolerance.   Psychiatric/Behavioral: Negative for behavioral problems and dysphoric mood. The patient is not nervous/anxious.        Objective   Physical Exam   Constitutional: He appears well-developed and well-nourished.   Neck: Neck supple. No thyromegaly present.   Cardiovascular: Normal rate and regular rhythm.   No murmur heard.  Pulmonary/Chest: Effort normal and breath sounds normal.   Abdominal: Bowel sounds are normal. There is no tenderness.   Psychiatric: He has a normal mood and affect. His behavior is normal.   Nursing note and vitals reviewed.    The patient has read and signed the [facility] Controlled Substance Contract.  I will continue to see patient for regular follow up appointments.  They are well controlled on their medication.  RITA has been reviewed by me and is updated every 3 months. The patient is aware of the potential for addiction and dependence.    Assessment/Plan   Vince was seen today for insomnia.    Diagnoses and all orders for this visit:    Primary insomnia  -     eszopiclone (LUNESTA) 3 MG tablet; Take 1 tablet by mouth Every Night. Take immediately before  bedtime    Impaired fasting glucose  -     Hemoglobin A1c    Annual physical exam  Comments:  for labs only, don't bill  Orders:  -     Comprehensive metabolic panel  -     Lipid panel  -     CBC and Differential  -     TSH  -     PSA    Other orders  -     Cancel: zolpidem CR (AMBIEN CR) 12.5 MG CR tablet; Take 1 tablet by mouth At Night As Needed for Sleep.  -     Cancel: traZODone (DESYREL) 100 MG tablet; Take 0.5-2 tabs QHS prn

## 2019-08-02 LAB
ALBUMIN SERPL-MCNC: 4.7 G/DL (ref 3.5–5.2)
ALBUMIN/GLOB SERPL: 2 G/DL
ALP SERPL-CCNC: 40 U/L (ref 39–117)
ALT SERPL-CCNC: 23 U/L (ref 1–41)
AST SERPL-CCNC: 28 U/L (ref 1–40)
BASOPHILS # BLD AUTO: 0.03 10*3/MM3 (ref 0–0.2)
BASOPHILS NFR BLD AUTO: 0.6 % (ref 0–1.5)
BILIRUB SERPL-MCNC: 0.4 MG/DL (ref 0.2–1.2)
BUN SERPL-MCNC: 17 MG/DL (ref 6–20)
BUN/CREAT SERPL: 16.5 (ref 7–25)
CALCIUM SERPL-MCNC: 9.8 MG/DL (ref 8.6–10.5)
CHLORIDE SERPL-SCNC: 99 MMOL/L (ref 98–107)
CHOLEST SERPL-MCNC: 204 MG/DL (ref 0–200)
CO2 SERPL-SCNC: 26.9 MMOL/L (ref 22–29)
CREAT SERPL-MCNC: 1.03 MG/DL (ref 0.76–1.27)
EOSINOPHIL # BLD AUTO: 0.01 10*3/MM3 (ref 0–0.4)
EOSINOPHIL NFR BLD AUTO: 0.2 % (ref 0.3–6.2)
ERYTHROCYTE [DISTWIDTH] IN BLOOD BY AUTOMATED COUNT: 12.9 % (ref 12.3–15.4)
GLOBULIN SER CALC-MCNC: 2.4 GM/DL
GLUCOSE SERPL-MCNC: 92 MG/DL (ref 65–99)
HBA1C MFR BLD: 5.2 % (ref 4.8–5.6)
HCT VFR BLD AUTO: 47.4 % (ref 37.5–51)
HDLC SERPL-MCNC: 70 MG/DL (ref 40–60)
HGB BLD-MCNC: 15.1 G/DL (ref 13–17.7)
IMM GRANULOCYTES # BLD AUTO: 0.01 10*3/MM3 (ref 0–0.05)
IMM GRANULOCYTES NFR BLD AUTO: 0.2 % (ref 0–0.5)
LDLC SERPL CALC-MCNC: 120 MG/DL (ref 0–100)
LYMPHOCYTES # BLD AUTO: 1.16 10*3/MM3 (ref 0.7–3.1)
LYMPHOCYTES NFR BLD AUTO: 23.9 % (ref 19.6–45.3)
MCH RBC QN AUTO: 32.8 PG (ref 26.6–33)
MCHC RBC AUTO-ENTMCNC: 31.9 G/DL (ref 31.5–35.7)
MCV RBC AUTO: 103 FL (ref 79–97)
MONOCYTES # BLD AUTO: 0.42 10*3/MM3 (ref 0.1–0.9)
MONOCYTES NFR BLD AUTO: 8.7 % (ref 5–12)
NEUTROPHILS # BLD AUTO: 3.22 10*3/MM3 (ref 1.7–7)
NEUTROPHILS NFR BLD AUTO: 66.4 % (ref 42.7–76)
NRBC BLD AUTO-RTO: 0 /100 WBC (ref 0–0.2)
PLATELET # BLD AUTO: 166 10*3/MM3 (ref 140–450)
POTASSIUM SERPL-SCNC: 4.6 MMOL/L (ref 3.5–5.2)
PROT SERPL-MCNC: 7.1 G/DL (ref 6–8.5)
PSA SERPL-MCNC: 0.88 NG/ML (ref 0–4)
RBC # BLD AUTO: 4.6 10*6/MM3 (ref 4.14–5.8)
SODIUM SERPL-SCNC: 140 MMOL/L (ref 136–145)
TRIGL SERPL-MCNC: 72 MG/DL (ref 0–150)
TSH SERPL DL<=0.005 MIU/L-ACNC: 2.64 MIU/ML (ref 0.27–4.2)
VLDLC SERPL CALC-MCNC: 14.4 MG/DL
WBC # BLD AUTO: 4.85 10*3/MM3 (ref 3.4–10.8)

## 2019-08-06 LAB
FOLATE SERPL-MCNC: 12.6 NG/ML (ref 4.78–24.2)
Lab: NORMAL
VIT B12 SERPL-MCNC: 405 PG/ML (ref 211–946)
WRITTEN AUTHORIZATION: NORMAL

## 2019-10-17 ENCOUNTER — OFFICE VISIT (OUTPATIENT)
Dept: FAMILY MEDICINE CLINIC | Facility: CLINIC | Age: 39
End: 2019-10-17

## 2019-10-17 VITALS
BODY MASS INDEX: 26.06 KG/M2 | RESPIRATION RATE: 16 BRPM | HEART RATE: 74 BPM | TEMPERATURE: 97.6 F | HEIGHT: 67 IN | SYSTOLIC BLOOD PRESSURE: 116 MMHG | WEIGHT: 166 LBS | DIASTOLIC BLOOD PRESSURE: 74 MMHG

## 2019-10-17 DIAGNOSIS — F51.01 PRIMARY INSOMNIA: ICD-10-CM

## 2019-10-17 PROCEDURE — 99213 OFFICE O/P EST LOW 20 MIN: CPT | Performed by: FAMILY MEDICINE

## 2019-10-17 RX ORDER — ZOLPIDEM TARTRATE 12.5 MG/1
12.5 TABLET, FILM COATED, EXTENDED RELEASE ORAL NIGHTLY PRN
Qty: 30 TABLET | Refills: 2 | Status: SHIPPED | OUTPATIENT
Start: 2019-10-17 | End: 2019-12-18 | Stop reason: SDUPTHER

## 2019-10-17 RX ORDER — ESZOPICLONE 3 MG/1
3 TABLET, FILM COATED ORAL NIGHTLY
Qty: 30 TABLET | Refills: 2 | Status: CANCELLED | OUTPATIENT
Start: 2019-10-17

## 2019-10-17 NOTE — PROGRESS NOTES
"Subjective   Vince Lynne is a 39 y.o. male.     History of Present Illness     Chief Complaint:   Chief Complaint   Patient presents with   • Insomnia     to discuss medications  - rosemary Lynne 39 y.o. male who presents today for Medical Management of the below listed issues and medication refills.  he has a problem list of   Patient Active Problem List   Diagnosis   • Impaired fasting glucose   • Insomnia   • Irritable bowel syndrome   • Obstructive sleep apnea   • Lumbar radiculopathy   • Left inguinal hernia   .  Since the last visit, he has overall felt well, although reports the Ambien worked better than the Lunesta, yet did have some early AM awakening with the regular Ambien.  he has been compliant with   Current Outpatient Medications:   •  diclofenac (VOLTAREN) 75 MG EC tablet, Take 1 tablet by mouth 2 (Two) Times a Day., Disp: 60 tablet, Rfl: 1  •  Multiple Vitamin (MULTI-VITAMIN DAILY PO), Take 1 tablet/day by mouth Daily. PT HOLDING FOR SURGERY, Disp: , Rfl:   •  zolpidem CR (AMBIEN CR) 12.5 MG CR tablet, Take 1 tablet by mouth At Night As Needed for Sleep., Disp: 30 tablet, Rfl: 2.  he denies medication side effects.    All of the other chronic condition(s) listed above are stable w/o issues.    /74   Pulse 74   Temp 97.6 °F (36.4 °C) (Oral)   Resp 16   Ht 170.2 cm (67\")   Wt 75.3 kg (166 lb)   BMI 26.00 kg/m²     Results for orders placed or performed in visit on 07/15/19   Comprehensive metabolic panel   Result Value Ref Range    Glucose 92 65 - 99 mg/dL    BUN 17 6 - 20 mg/dL    Creatinine 1.03 0.76 - 1.27 mg/dL    eGFR Non African Am 80 >60 mL/min/1.73    eGFR African Am 97 >60 mL/min/1.73    BUN/Creatinine Ratio 16.5 7.0 - 25.0    Sodium 140 136 - 145 mmol/L    Potassium 4.6 3.5 - 5.2 mmol/L    Chloride 99 98 - 107 mmol/L    Total CO2 26.9 22.0 - 29.0 mmol/L    Calcium 9.8 8.6 - 10.5 mg/dL    Total Protein 7.1 6.0 - 8.5 g/dL    Albumin 4.70 3.50 - 5.20 g/dL    " Globulin 2.4 gm/dL    A/G Ratio 2.0 g/dL    Total Bilirubin 0.4 0.2 - 1.2 mg/dL    Alkaline Phosphatase 40 39 - 117 U/L    AST (SGOT) 28 1 - 40 U/L    ALT (SGPT) 23 1 - 41 U/L   Lipid panel   Result Value Ref Range    Total Cholesterol 204 (H) 0 - 200 mg/dL    Triglycerides 72 0 - 150 mg/dL    HDL Cholesterol 70 (H) 40 - 60 mg/dL    VLDL Cholesterol 14.4 mg/dL    LDL Cholesterol  120 (H) 0 - 100 mg/dL   TSH   Result Value Ref Range    TSH 2.640 0.270 - 4.200 mIU/mL   PSA   Result Value Ref Range    PSA 0.876 0.000 - 4.000 ng/mL   Hemoglobin A1c   Result Value Ref Range    Hemoglobin A1C 5.20 4.80 - 5.60 %   Vitamin B12 & Folate   Result Value Ref Range    Vitamin B-12 405 211 - 946 pg/mL    Folate 12.60 4.78 - 24.20 ng/mL   Please Note   Result Value Ref Range    Please note Comment    Written Authorization   Result Value Ref Range    Written Authorization Comment    CBC and Differential   Result Value Ref Range    WBC 4.85 3.40 - 10.80 10*3/mm3    RBC 4.60 4.14 - 5.80 10*6/mm3    Hemoglobin 15.1 13.0 - 17.7 g/dL    Hematocrit 47.4 37.5 - 51.0 %    .0 (H) 79.0 - 97.0 fL    MCH 32.8 26.6 - 33.0 pg    MCHC 31.9 31.5 - 35.7 g/dL    RDW 12.9 12.3 - 15.4 %    Platelets 166 140 - 450 10*3/mm3    Neutrophil Rel % 66.4 42.7 - 76.0 %    Lymphocyte Rel % 23.9 19.6 - 45.3 %    Monocyte Rel % 8.7 5.0 - 12.0 %    Eosinophil Rel % 0.2 (L) 0.3 - 6.2 %    Basophil Rel % 0.6 0.0 - 1.5 %    Neutrophils Absolute 3.22 1.70 - 7.00 10*3/mm3    Lymphocytes Absolute 1.16 0.70 - 3.10 10*3/mm3    Monocytes Absolute 0.42 0.10 - 0.90 10*3/mm3    Eosinophils Absolute 0.01 0.00 - 0.40 10*3/mm3    Basophils Absolute 0.03 0.00 - 0.20 10*3/mm3    Immature Granulocyte Rel % 0.2 0.0 - 0.5 %    Immature Grans Absolute 0.01 0.00 - 0.05 10*3/mm3    nRBC 0.0 0.0 - 0.2 /100 WBC           The following portions of the patient's history were reviewed and updated as appropriate: allergies, current medications, past family history, past medical  history, past social history, past surgical history and problem list.    Review of Systems   Constitutional: Negative for activity change, chills, fatigue and fever.   Respiratory: Negative for cough and shortness of breath.    Cardiovascular: Negative for chest pain and palpitations.   Gastrointestinal: Negative for abdominal pain.   Endocrine: Negative for cold intolerance.   Psychiatric/Behavioral: Negative for behavioral problems and dysphoric mood. The patient is not nervous/anxious.        Objective   Physical Exam   Constitutional: He appears well-developed and well-nourished.   Neck: Neck supple. No thyromegaly present.   Cardiovascular: Normal rate and regular rhythm.   No murmur heard.  Pulmonary/Chest: Effort normal and breath sounds normal.   Abdominal: Bowel sounds are normal. There is no tenderness.   Psychiatric: He has a normal mood and affect. His behavior is normal.   Nursing note and vitals reviewed.    The patient has read and signed the [facility] Controlled Substance Contract.  I will continue to see patient for regular follow up appointments.  They are well controlled on their medication.  RITA has been reviewed by me and is updated every 3 months. The patient is aware of the potential for addiction and dependence.    Assessment/Plan   Vince was seen today for insomnia.    Diagnoses and all orders for this visit:    Primary insomnia  Comments:  unresponsive to rx.  Orders:  -     zolpidem CR (AMBIEN CR) 12.5 MG CR tablet; Take 1 tablet by mouth At Night As Needed for Sleep.    Other orders  -     Cancel: eszopiclone (LUNESTA) 3 MG tablet; Take 1 tablet by mouth Every Night. Take immediately before bedtime

## 2019-12-18 ENCOUNTER — OFFICE VISIT (OUTPATIENT)
Dept: FAMILY MEDICINE CLINIC | Facility: CLINIC | Age: 39
End: 2019-12-18

## 2019-12-18 VITALS
TEMPERATURE: 97.8 F | RESPIRATION RATE: 14 BRPM | SYSTOLIC BLOOD PRESSURE: 119 MMHG | BODY MASS INDEX: 25.43 KG/M2 | WEIGHT: 162 LBS | DIASTOLIC BLOOD PRESSURE: 75 MMHG | HEART RATE: 64 BPM | HEIGHT: 67 IN

## 2019-12-18 DIAGNOSIS — M54.2 CERVICALGIA: Primary | ICD-10-CM

## 2019-12-18 DIAGNOSIS — F51.01 PRIMARY INSOMNIA: ICD-10-CM

## 2019-12-18 PROCEDURE — 99214 OFFICE O/P EST MOD 30 MIN: CPT | Performed by: FAMILY MEDICINE

## 2019-12-18 RX ORDER — ZOLPIDEM TARTRATE 12.5 MG/1
12.5 TABLET, FILM COATED, EXTENDED RELEASE ORAL NIGHTLY PRN
Qty: 30 TABLET | Refills: 2 | Status: ON HOLD | OUTPATIENT
Start: 2019-12-18 | End: 2021-04-10

## 2019-12-18 RX ORDER — DICLOFENAC SODIUM 75 MG/1
75 TABLET, DELAYED RELEASE ORAL 2 TIMES DAILY
Qty: 60 TABLET | Refills: 1 | Status: ON HOLD | OUTPATIENT
Start: 2019-12-18 | End: 2021-04-10

## 2019-12-18 RX ORDER — CYCLOBENZAPRINE HCL 10 MG
10 TABLET ORAL 2 TIMES DAILY PRN
Qty: 60 TABLET | Refills: 2 | Status: ON HOLD | OUTPATIENT
Start: 2019-12-18 | End: 2021-04-10

## 2019-12-18 RX ORDER — METHYLPREDNISOLONE 4 MG/1
TABLET ORAL
Qty: 21 TABLET | Refills: 0 | Status: ON HOLD | OUTPATIENT
Start: 2019-12-18 | End: 2021-04-10

## 2019-12-18 NOTE — PROGRESS NOTES
"Subjective   Vince Lynne is a 39 y.o. male.     History of Present Illness     Chief Complaint:   Chief Complaint   Patient presents with   • neck pain     radiates down to rt shoulder /arm - tingling sensation x 1-2 weeks    • Insomnia     med refill [facility]        Vince Lynne 39 y.o. male who presents today for Medical Management of the below listed issues and medication refills.  he has a problem list of   Patient Active Problem List   Diagnosis   • Impaired fasting glucose   • Insomnia   • Irritable bowel syndrome   • Obstructive sleep apnea   • Lumbar radiculopathy   • Left inguinal hernia   .  Since the last visit, he has overall felt well until 10 days ago when he developed (he thinks after playing racCodacy) left Trapezius pain that can cause numbness/tingling on the dorsal surface of the left arm. Pt is very active and exercises most every day (weights, too). No change in . Worse with sitting down. Tried Advil with mild relief.  he has been compliant with   Current Outpatient Medications:   •  zolpidem CR (AMBIEN CR) 12.5 MG CR tablet, Take 1 tablet by mouth At Night As Needed for Sleep., Disp: 30 tablet, Rfl: 2  •  cyclobenzaprine (FLEXERIL) 10 MG tablet, Take 1 tablet by mouth 2 (Two) Times a Day As Needed for Muscle Spasms., Disp: 60 tablet, Rfl: 2  •  diclofenac (VOLTAREN) 75 MG EC tablet, Take 1 tablet by mouth 2 (Two) Times a Day., Disp: 60 tablet, Rfl: 1  •  methylPREDNISolone (MEDROL) 4 MG tablet, follow package directions, Disp: 21 tablet, Rfl: 0  •  Multiple Vitamin (MULTI-VITAMIN DAILY PO), Take 1 tablet/day by mouth Daily. PT HOLDING FOR SURGERY, Disp: , Rfl: .  he denies medication side effects.    All of the other chronic condition(s) listed above are stable w/o issues.    /75   Pulse 64   Temp 97.8 °F (36.6 °C) (Oral)   Resp 14   Ht 170.2 cm (67\")   Wt 73.5 kg (162 lb)   BMI 25.37 kg/m²     Results for orders placed or performed in visit on 07/15/19 "   Comprehensive metabolic panel   Result Value Ref Range    Glucose 92 65 - 99 mg/dL    BUN 17 6 - 20 mg/dL    Creatinine 1.03 0.76 - 1.27 mg/dL    eGFR Non African Am 80 >60 mL/min/1.73    eGFR African Am 97 >60 mL/min/1.73    BUN/Creatinine Ratio 16.5 7.0 - 25.0    Sodium 140 136 - 145 mmol/L    Potassium 4.6 3.5 - 5.2 mmol/L    Chloride 99 98 - 107 mmol/L    Total CO2 26.9 22.0 - 29.0 mmol/L    Calcium 9.8 8.6 - 10.5 mg/dL    Total Protein 7.1 6.0 - 8.5 g/dL    Albumin 4.70 3.50 - 5.20 g/dL    Globulin 2.4 gm/dL    A/G Ratio 2.0 g/dL    Total Bilirubin 0.4 0.2 - 1.2 mg/dL    Alkaline Phosphatase 40 39 - 117 U/L    AST (SGOT) 28 1 - 40 U/L    ALT (SGPT) 23 1 - 41 U/L   Lipid panel   Result Value Ref Range    Total Cholesterol 204 (H) 0 - 200 mg/dL    Triglycerides 72 0 - 150 mg/dL    HDL Cholesterol 70 (H) 40 - 60 mg/dL    VLDL Cholesterol 14.4 mg/dL    LDL Cholesterol  120 (H) 0 - 100 mg/dL   TSH   Result Value Ref Range    TSH 2.640 0.270 - 4.200 mIU/mL   PSA   Result Value Ref Range    PSA 0.876 0.000 - 4.000 ng/mL   Hemoglobin A1c   Result Value Ref Range    Hemoglobin A1C 5.20 4.80 - 5.60 %   Vitamin B12 & Folate   Result Value Ref Range    Vitamin B-12 405 211 - 946 pg/mL    Folate 12.60 4.78 - 24.20 ng/mL   Please Note   Result Value Ref Range    Please note Comment    Written Authorization   Result Value Ref Range    Written Authorization Comment    CBC and Differential   Result Value Ref Range    WBC 4.85 3.40 - 10.80 10*3/mm3    RBC 4.60 4.14 - 5.80 10*6/mm3    Hemoglobin 15.1 13.0 - 17.7 g/dL    Hematocrit 47.4 37.5 - 51.0 %    .0 (H) 79.0 - 97.0 fL    MCH 32.8 26.6 - 33.0 pg    MCHC 31.9 31.5 - 35.7 g/dL    RDW 12.9 12.3 - 15.4 %    Platelets 166 140 - 450 10*3/mm3    Neutrophil Rel % 66.4 42.7 - 76.0 %    Lymphocyte Rel % 23.9 19.6 - 45.3 %    Monocyte Rel % 8.7 5.0 - 12.0 %    Eosinophil Rel % 0.2 (L) 0.3 - 6.2 %    Basophil Rel % 0.6 0.0 - 1.5 %    Neutrophils Absolute 3.22 1.70 - 7.00  10*3/mm3    Lymphocytes Absolute 1.16 0.70 - 3.10 10*3/mm3    Monocytes Absolute 0.42 0.10 - 0.90 10*3/mm3    Eosinophils Absolute 0.01 0.00 - 0.40 10*3/mm3    Basophils Absolute 0.03 0.00 - 0.20 10*3/mm3    Immature Granulocyte Rel % 0.2 0.0 - 0.5 %    Immature Grans Absolute 0.01 0.00 - 0.05 10*3/mm3    nRBC 0.0 0.0 - 0.2 /100 WBC           The following portions of the patient's history were reviewed and updated as appropriate: allergies, current medications, past family history, past medical history, past social history, past surgical history and problem list.    Review of Systems   Constitutional: Negative for activity change, chills, fatigue and fever.   Respiratory: Negative for cough and shortness of breath.    Cardiovascular: Negative for chest pain and palpitations.   Gastrointestinal: Negative for abdominal pain.   Endocrine: Negative for cold intolerance.   Musculoskeletal: Positive for neck pain.   Neurological: Positive for numbness (and tingling (at times)).   Psychiatric/Behavioral: Negative for behavioral problems and dysphoric mood. The patient is not nervous/anxious.        Objective   Physical Exam   Constitutional: He appears well-developed and well-nourished.   Musculoskeletal: He exhibits tenderness (upper left Trapezius with full ROM and normal strength).   Psychiatric: He has a normal mood and affect.       Assessment/Plan   Vince was seen today for neck pain and insomnia.    Diagnoses and all orders for this visit:    Cervicalgia  -     methylPREDNISolone (MEDROL) 4 MG tablet; follow package directions  -     cyclobenzaprine (FLEXERIL) 10 MG tablet; Take 1 tablet by mouth 2 (Two) Times a Day As Needed for Muscle Spasms.  -     diclofenac (VOLTAREN) 75 MG EC tablet; Take 1 tablet by mouth 2 (Two) Times a Day.    Primary insomnia  -     zolpidem CR (AMBIEN CR) 12.5 MG CR tablet; Take 1 tablet by mouth At Night As Needed for Sleep.    Heat/stretching/break from weight training at this  time.

## 2021-04-02 ENCOUNTER — BULK ORDERING (OUTPATIENT)
Dept: CASE MANAGEMENT | Facility: OTHER | Age: 41
End: 2021-04-02

## 2021-04-02 DIAGNOSIS — Z23 IMMUNIZATION DUE: ICD-10-CM

## 2021-04-09 ENCOUNTER — HOSPITAL ENCOUNTER (OUTPATIENT)
Facility: HOSPITAL | Age: 41
Setting detail: OBSERVATION
Discharge: HOME | End: 2021-04-11
Attending: EMERGENCY MEDICINE | Admitting: HOSPITALIST

## 2021-04-09 DIAGNOSIS — E86.0 MODERATE DEHYDRATION: Primary | ICD-10-CM

## 2021-04-09 DIAGNOSIS — R74.8 ELEVATED LIVER ENZYMES: ICD-10-CM

## 2021-04-09 DIAGNOSIS — F10.20 ALCOHOL USE DISORDER, SEVERE, DEPENDENCE (HCC): ICD-10-CM

## 2021-04-09 DIAGNOSIS — D69.59 SECONDARY THROMBOCYTOPENIA: ICD-10-CM

## 2021-04-09 PROBLEM — F10.10 ALCOHOL ABUSE: Status: ACTIVE | Noted: 2021-04-09

## 2021-04-09 PROBLEM — K70.10 ALCOHOLIC HEPATITIS: Status: ACTIVE | Noted: 2021-04-09

## 2021-04-09 PROBLEM — F10.929 ALCOHOL INTOXICATION (HCC): Status: ACTIVE | Noted: 2021-04-09

## 2021-04-09 PROBLEM — S01.01XA SCALP LACERATION: Status: ACTIVE | Noted: 2021-04-09

## 2021-04-09 LAB
ALBUMIN SERPL-MCNC: 4 G/DL (ref 3.5–5.2)
ALBUMIN/GLOB SERPL: 1.7 G/DL
ALP SERPL-CCNC: 197 U/L (ref 39–117)
ALT SERPL W P-5'-P-CCNC: 143 U/L (ref 1–41)
AMMONIA BLD-SCNC: 30 UMOL/L (ref 16–60)
AMPHET+METHAMPHET UR QL: NEGATIVE
ANION GAP SERPL CALCULATED.3IONS-SCNC: 26.9 MMOL/L (ref 5–15)
AST SERPL-CCNC: 373 U/L (ref 1–40)
BACTERIA UR QL AUTO: NORMAL /HPF
BARBITURATES UR QL SCN: POSITIVE
BASOPHILS # BLD MANUAL: 0.03 10*3/MM3 (ref 0–0.2)
BASOPHILS NFR BLD AUTO: 1 % (ref 0–1.5)
BENZODIAZ UR QL SCN: NEGATIVE
BILIRUB SERPL-MCNC: 2.5 MG/DL (ref 0–1.2)
BILIRUB UR QL STRIP: NEGATIVE
BUN SERPL-MCNC: 8 MG/DL (ref 6–20)
BUN/CREAT SERPL: 8.2 (ref 7–25)
CALCIUM SPEC-SCNC: 8 MG/DL (ref 8.6–10.5)
CANNABINOIDS SERPL QL: NEGATIVE
CHLORIDE SERPL-SCNC: 91 MMOL/L (ref 98–107)
CLARITY UR: CLEAR
CO2 SERPL-SCNC: 19.1 MMOL/L (ref 22–29)
COCAINE UR QL: NEGATIVE
COLOR UR: ABNORMAL
CREAT SERPL-MCNC: 0.97 MG/DL (ref 0.76–1.27)
DEPRECATED RDW RBC AUTO: 58.8 FL (ref 37–54)
ERYTHROCYTE [DISTWIDTH] IN BLOOD BY AUTOMATED COUNT: 15.6 % (ref 12.3–15.4)
ETHANOL BLD-MCNC: 320 MG/DL (ref 0–10)
ETHANOL UR QL: 0.32 %
GFR SERPL CREATININE-BSD FRML MDRD: 85 ML/MIN/1.73
GLOBULIN UR ELPH-MCNC: 2.4 GM/DL
GLUCOSE SERPL-MCNC: 131 MG/DL (ref 65–99)
GLUCOSE UR STRIP-MCNC: NEGATIVE MG/DL
GRAN CASTS URNS QL MICRO: NORMAL /LPF
HCT VFR BLD AUTO: 36.9 % (ref 37.5–51)
HGB BLD-MCNC: 13 G/DL (ref 13–17.7)
HGB UR QL STRIP.AUTO: NEGATIVE
HYALINE CASTS UR QL AUTO: NORMAL /LPF
INR PPP: 1.03 (ref 0.9–1.1)
KETONES UR QL STRIP: ABNORMAL
LEUKOCYTE ESTERASE UR QL STRIP.AUTO: NEGATIVE
LIPASE SERPL-CCNC: 39 U/L (ref 13–60)
LYMPHOCYTES # BLD MANUAL: 0.65 10*3/MM3 (ref 0.7–3.1)
LYMPHOCYTES NFR BLD MANUAL: 23 % (ref 19.6–45.3)
LYMPHOCYTES NFR BLD MANUAL: 6 % (ref 5–12)
MAGNESIUM SERPL-MCNC: 1.5 MG/DL (ref 1.6–2.6)
MCH RBC QN AUTO: 36.8 PG (ref 26.6–33)
MCHC RBC AUTO-ENTMCNC: 35.2 G/DL (ref 31.5–35.7)
MCV RBC AUTO: 104.5 FL (ref 79–97)
METHADONE UR QL SCN: NEGATIVE
MONOCYTES # BLD AUTO: 0.17 10*3/MM3 (ref 0.1–0.9)
NEUTROPHILS # BLD AUTO: 1.97 10*3/MM3 (ref 1.7–7)
NEUTROPHILS NFR BLD MANUAL: 70 % (ref 42.7–76)
NITRITE UR QL STRIP: NEGATIVE
OPIATES UR QL: NEGATIVE
OXYCODONE UR QL SCN: NEGATIVE
PH UR STRIP.AUTO: 5.5 [PH] (ref 5–8)
PLAT MORPH BLD: NORMAL
PLATELET # BLD AUTO: 148 10*3/MM3 (ref 140–450)
PMV BLD AUTO: 11.2 FL (ref 6–12)
POTASSIUM SERPL-SCNC: 3.9 MMOL/L (ref 3.5–5.2)
PROT SERPL-MCNC: 6.4 G/DL (ref 6–8.5)
PROT UR QL STRIP: ABNORMAL
PROTHROMBIN TIME: 13.4 SECONDS (ref 11.7–14.2)
QT INTERVAL: 359 MS
RBC # BLD AUTO: 3.53 10*6/MM3 (ref 4.14–5.8)
RBC # UR: NORMAL /HPF
RBC MORPH BLD: NORMAL
REF LAB TEST METHOD: NORMAL
SODIUM SERPL-SCNC: 137 MMOL/L (ref 136–145)
SP GR UR STRIP: 1.01 (ref 1–1.03)
SQUAMOUS #/AREA URNS HPF: NORMAL /HPF
UROBILINOGEN UR QL STRIP: ABNORMAL
WBC # BLD AUTO: 2.81 10*3/MM3 (ref 3.4–10.8)
WBC MORPH BLD: NORMAL
WBC UR QL AUTO: NORMAL /HPF

## 2021-04-09 PROCEDURE — 80307 DRUG TEST PRSMV CHEM ANLYZR: CPT | Performed by: NURSE PRACTITIONER

## 2021-04-09 PROCEDURE — 25010000002 MAGNESIUM SULFATE 2 GM/50ML SOLUTION: Performed by: NURSE PRACTITIONER

## 2021-04-09 PROCEDURE — 82077 ASSAY SPEC XCP UR&BREATH IA: CPT | Performed by: NURSE PRACTITIONER

## 2021-04-09 PROCEDURE — 85025 COMPLETE CBC W/AUTO DIFF WBC: CPT | Performed by: NURSE PRACTITIONER

## 2021-04-09 PROCEDURE — U0004 COV-19 TEST NON-CDC HGH THRU: HCPCS | Performed by: NURSE PRACTITIONER

## 2021-04-09 PROCEDURE — 81001 URINALYSIS AUTO W/SCOPE: CPT | Performed by: NURSE PRACTITIONER

## 2021-04-09 PROCEDURE — 96372 THER/PROPH/DIAG INJ SC/IM: CPT

## 2021-04-09 PROCEDURE — G0378 HOSPITAL OBSERVATION PER HR: HCPCS

## 2021-04-09 PROCEDURE — 85007 BL SMEAR W/DIFF WBC COUNT: CPT | Performed by: NURSE PRACTITIONER

## 2021-04-09 PROCEDURE — 96368 THER/DIAG CONCURRENT INF: CPT

## 2021-04-09 PROCEDURE — 93005 ELECTROCARDIOGRAM TRACING: CPT | Performed by: NURSE PRACTITIONER

## 2021-04-09 PROCEDURE — 25010000002 PHENOBARBITAL PER 120 MG: Performed by: NURSE PRACTITIONER

## 2021-04-09 PROCEDURE — 25010000002 THIAMINE PER 100 MG: Performed by: NURSE PRACTITIONER

## 2021-04-09 PROCEDURE — 93010 ELECTROCARDIOGRAM REPORT: CPT | Performed by: INTERNAL MEDICINE

## 2021-04-09 PROCEDURE — 83690 ASSAY OF LIPASE: CPT | Performed by: NURSE PRACTITIONER

## 2021-04-09 PROCEDURE — 85610 PROTHROMBIN TIME: CPT | Performed by: NURSE PRACTITIONER

## 2021-04-09 PROCEDURE — 96365 THER/PROPH/DIAG IV INF INIT: CPT

## 2021-04-09 PROCEDURE — 80053 COMPREHEN METABOLIC PANEL: CPT | Performed by: NURSE PRACTITIONER

## 2021-04-09 PROCEDURE — 82140 ASSAY OF AMMONIA: CPT | Performed by: NURSE PRACTITIONER

## 2021-04-09 PROCEDURE — 99285 EMERGENCY DEPT VISIT HI MDM: CPT

## 2021-04-09 PROCEDURE — 83735 ASSAY OF MAGNESIUM: CPT | Performed by: NURSE PRACTITIONER

## 2021-04-09 PROCEDURE — C9803 HOPD COVID-19 SPEC COLLECT: HCPCS

## 2021-04-09 RX ORDER — FOLIC ACID 1 MG/1
1 TABLET ORAL DAILY
Status: DISCONTINUED | OUTPATIENT
Start: 2021-04-10 | End: 2021-04-11 | Stop reason: HOSPADM

## 2021-04-09 RX ORDER — DIPHENOXYLATE HYDROCHLORIDE AND ATROPINE SULFATE 2.5; .025 MG/1; MG/1
1 TABLET ORAL DAILY
Status: DISCONTINUED | OUTPATIENT
Start: 2021-04-10 | End: 2021-04-11 | Stop reason: HOSPADM

## 2021-04-09 RX ORDER — NITROGLYCERIN 0.4 MG/1
0.4 TABLET SUBLINGUAL
Status: DISCONTINUED | OUTPATIENT
Start: 2021-04-09 | End: 2021-04-11 | Stop reason: HOSPADM

## 2021-04-09 RX ORDER — ONDANSETRON 2 MG/ML
4 INJECTION INTRAMUSCULAR; INTRAVENOUS EVERY 6 HOURS PRN
Status: DISCONTINUED | OUTPATIENT
Start: 2021-04-09 | End: 2021-04-11 | Stop reason: HOSPADM

## 2021-04-09 RX ORDER — MAGNESIUM SULFATE HEPTAHYDRATE 40 MG/ML
2 INJECTION, SOLUTION INTRAVENOUS ONCE
Status: COMPLETED | OUTPATIENT
Start: 2021-04-09 | End: 2021-04-09

## 2021-04-09 RX ORDER — LORAZEPAM 1 MG/1
1 TABLET ORAL EVERY 6 HOURS
Status: DISCONTINUED | OUTPATIENT
Start: 2021-04-09 | End: 2021-04-11

## 2021-04-09 RX ORDER — LORAZEPAM 1 MG/1
1 TABLET ORAL
Status: DISCONTINUED | OUTPATIENT
Start: 2021-04-09 | End: 2021-04-11 | Stop reason: HOSPADM

## 2021-04-09 RX ORDER — LORAZEPAM 2 MG/ML
2 INJECTION INTRAMUSCULAR
Status: DISCONTINUED | OUTPATIENT
Start: 2021-04-09 | End: 2021-04-11 | Stop reason: HOSPADM

## 2021-04-09 RX ORDER — LORAZEPAM 1 MG/1
1 TABLET ORAL EVERY 8 HOURS
Status: DISCONTINUED | OUTPATIENT
Start: 2021-04-11 | End: 2021-04-11

## 2021-04-09 RX ORDER — SODIUM CHLORIDE 0.9 % (FLUSH) 0.9 %
10 SYRINGE (ML) INJECTION AS NEEDED
Status: DISCONTINUED | OUTPATIENT
Start: 2021-04-09 | End: 2021-04-11 | Stop reason: HOSPADM

## 2021-04-09 RX ORDER — LORAZEPAM 2 MG/ML
1 INJECTION INTRAMUSCULAR
Status: DISCONTINUED | OUTPATIENT
Start: 2021-04-09 | End: 2021-04-11 | Stop reason: HOSPADM

## 2021-04-09 RX ORDER — FAMOTIDINE 10 MG/ML
20 INJECTION, SOLUTION INTRAVENOUS EVERY 12 HOURS SCHEDULED
Status: DISCONTINUED | OUTPATIENT
Start: 2021-04-10 | End: 2021-04-11

## 2021-04-09 RX ORDER — SODIUM CHLORIDE 0.9 % (FLUSH) 0.9 %
10 SYRINGE (ML) INJECTION EVERY 12 HOURS SCHEDULED
Status: DISCONTINUED | OUTPATIENT
Start: 2021-04-10 | End: 2021-04-11 | Stop reason: HOSPADM

## 2021-04-09 RX ORDER — PHENOBARBITAL SODIUM 65 MG/ML
260 INJECTION INTRAMUSCULAR ONCE
Status: COMPLETED | OUTPATIENT
Start: 2021-04-09 | End: 2021-04-09

## 2021-04-09 RX ORDER — SODIUM CHLORIDE 0.9 % (FLUSH) 0.9 %
10 SYRINGE (ML) INJECTION AS NEEDED
Status: DISCONTINUED | OUTPATIENT
Start: 2021-04-09 | End: 2021-04-11

## 2021-04-09 RX ORDER — LORAZEPAM 1 MG/1
2 TABLET ORAL
Status: DISCONTINUED | OUTPATIENT
Start: 2021-04-09 | End: 2021-04-11 | Stop reason: HOSPADM

## 2021-04-09 RX ADMIN — LORAZEPAM 1 MG: 1 TABLET ORAL at 23:45

## 2021-04-09 RX ADMIN — PHENOBARBITAL SODIUM 260 MG: 65 INJECTION INTRAMUSCULAR; INTRAVENOUS at 17:58

## 2021-04-09 RX ADMIN — MAGNESIUM SULFATE HEPTAHYDRATE 2 G: 2 INJECTION, SOLUTION INTRAVENOUS at 17:26

## 2021-04-09 RX ADMIN — FOLIC ACID 1000 ML/HR: 5 INJECTION, SOLUTION INTRAMUSCULAR; INTRAVENOUS; SUBCUTANEOUS at 17:20

## 2021-04-09 RX ADMIN — SODIUM CHLORIDE, PRESERVATIVE FREE 10 ML: 5 INJECTION INTRAVENOUS at 23:45

## 2021-04-09 NOTE — ED PROVIDER NOTES
"EMERGENCY DEPARTMENT ENCOUNTER    Room Number:  42/42  Date seen:  4/9/2021  Time seen: 15:12 EDT  PCP: Vito Ortiz MD  Historian: Patient, EMS    HPI:  Chief complaint: Alcohol intoxication  A complete HPI/ROS/PMH/PSH/SH/FH are unobtainable due to: Not applicable  Context:Vince Lynne is a 40 y.o. male who presents to the ED with c/o \"drinking too much\" today and being found in his car in a parking lot at [facility].  His symptoms are not made better or worse by anything.  He states he drinks heavily 3-4 times a week and when he does drink he drinks 5-10 drinks per day.  He states that he drinks mostly to help him sleep as he suffers from insomnia.  He does endorse some depression but is very vague, however, he denies suicidal ideation or homicidal ideation.  He also denies AH or VH.  He does state that if he does not drink he gets a little tremulous at times.  He denies other illicit substances or tobacco other than [place] dip.    Patient was placed in face mask in first look. Patient was wearing facemask when I entered the room and throughout our encounter. I wore full protective equipment throughout this patient encounter including a face mask, eye shield and gloves. Hand hygiene/washing of hands was performed before donning protective equipment and after removal when leaving the room.      MEDICAL RECORD REVIEW    ALLERGIES  Patient has no known allergies.    PAST MEDICAL HISTORY  Active Ambulatory Problems     Diagnosis Date Noted   • Impaired fasting glucose 06/30/2014   • Insomnia    • Irritable bowel syndrome 10/25/2011   • Obstructive sleep apnea 09/01/2015   • Lumbar radiculopathy 12/09/2016   • Left inguinal hernia 05/23/2018     Resolved Ambulatory Problems     Diagnosis Date Noted   • No Resolved Ambulatory Problems     Past Medical History:   Diagnosis Date   • Anxiety    • Inguinal hernia        PAST SURGICAL HISTORY  Past Surgical History:   Procedure Laterality Date   • COLONOSCOPY N/A " 02/17/2014    Normal ileum, normal colon, non-bleeding internal hemorrhoids-Dr. Edy Oviedo   • HERNIA REPAIR  7/19/2018   • INGUINAL HERNIA REPAIR Left 7/19/2018    Procedure: BILATERAL INGUINAL HERNIA REPAIR LAPAROSCOPIC;  Surgeon: Kylah Alvarez MD;  [place] [facility] [place] [facility] [place]  Service: General   • KNEE ARTHROSCOPY W/ MENISCECTOMY Left     2006   • UPPER GASTROINTESTINAL ENDOSCOPY N/A 02/17/2014    LA Grade B reflux esophagitis, normal stomach, normal examined duodenum-Dr. Edy Oviedo       FAMILY HISTORY  Family History   Problem Relation Age of Onset   • Heart attack Mother    • Diabetes Father    • Malig Hyperthermia Neg Hx        SOCIAL HISTORY  Social History     Socioeconomic History   • Marital status: Single     Spouse name: Not on file   • Number of children: Not on file   • Years of education: Not on file   • Highest education level: Not on file   Tobacco Use   • Smoking status: Never Smoker   • Smokeless tobacco: Former User   Substance and Sexual Activity   • Alcohol use: Yes     Comment: WEEKENDS   • Drug use: No   • Sexual activity: Yes     Partners: Female     Birth control/protection: Condom       REVIEW OF SYSTEMS  Review of Systems    All systems reviewed and negative except for those discussed in HPI.     PHYSICAL EXAM    ED Triage Vitals [04/09/21 1457]   Temp Heart Rate Resp BP SpO2   -- (!) 122 16 127/76 97 %      Temp src Heart Rate Source Patient Position BP Location FiO2 (%)   -- Monitor -- -- --     Physical Exam    I have reviewed the triage vital signs and nursing notes.      GENERAL: not distressed  HENT: nares patent, mm dry  EYES:  scleral icterus, PERRL  NECK: no ROM limitations  CV: regular rhythm, tachycardia, no murmur, no rubs no gallops  RESPIRATORY: normal effort, clear to auscultate bilaterally  ABDOMEN: soft, no focal tenderness, no guarding no rebound, bowel sounds are normal  : deferred  MUSCULOSKELETAL: no deformity  NEURO: alert, moves all extremities, follows  commands  SKIN: warm, dry, slight pallor and/or jaundice    LAB RESULTS  Recent Results (from the past 24 hour(s))   Comprehensive Metabolic Panel    Collection Time: 04/09/21  3:27 PM    Specimen: Blood   Result Value Ref Range    Glucose 131 (H) 65 - 99 mg/dL    BUN 8 6 - 20 mg/dL    Creatinine 0.97 0.76 - 1.27 mg/dL    Sodium 137 136 - 145 mmol/L    Potassium 3.9 3.5 - 5.2 mmol/L    Chloride 91 (L) 98 - 107 mmol/L    CO2 19.1 (L) 22.0 - 29.0 mmol/L    Calcium 8.0 (L) 8.6 - 10.5 mg/dL    Total Protein 6.4 6.0 - 8.5 g/dL    Albumin 4.00 3.50 - 5.20 g/dL    ALT (SGPT) 143 (H) 1 - 41 U/L    AST (SGOT) 373 (H) 1 - 40 U/L    Alkaline Phosphatase 197 (H) 39 - 117 U/L    Total Bilirubin 2.5 (H) 0.0 - 1.2 mg/dL    eGFR Non African Amer 85 >60 mL/min/1.73    Globulin 2.4 gm/dL    A/G Ratio 1.7 g/dL    BUN/Creatinine Ratio 8.2 7.0 - 25.0    Anion Gap 26.9 (H) 5.0 - 15.0 mmol/L   Protime-INR    Collection Time: 04/09/21  3:27 PM    Specimen: Blood   Result Value Ref Range    Protime 13.4 11.7 - 14.2 Seconds    INR 1.03 0.90 - 1.10   Lipase    Collection Time: 04/09/21  3:27 PM    Specimen: Blood   Result Value Ref Range    Lipase 39 13 - 60 U/L   Ethanol    Collection Time: 04/09/21  3:27 PM    Specimen: Blood   Result Value Ref Range    Ethanol 320 (H) 0 - 10 mg/dL    Ethanol % 0.320 %   Magnesium    Collection Time: 04/09/21  3:27 PM    Specimen: Blood   Result Value Ref Range    Magnesium 1.5 (L) 1.6 - 2.6 mg/dL   Ammonia    Collection Time: 04/09/21  3:27 PM    Specimen: Blood   Result Value Ref Range    Ammonia 30 16 - 60 umol/L   CBC Auto Differential    Collection Time: 04/09/21  3:27 PM    Specimen: Blood   Result Value Ref Range    WBC 2.81 (L) 3.40 - 10.80 10*3/mm3    RBC 3.53 (L) 4.14 - 5.80 10*6/mm3    Hemoglobin 13.0 13.0 - 17.7 g/dL    Hematocrit 36.9 (L) 37.5 - 51.0 %    .5 (H) 79.0 - 97.0 fL    MCH 36.8 (H) 26.6 - 33.0 pg    MCHC 35.2 31.5 - 35.7 g/dL    RDW 15.6 (H) 12.3 - 15.4 %    RDW-SD 58.8 (H)  "37.0 - 54.0 fl    MPV 11.2 6.0 - 12.0 fL    Platelets 148 140 - 450 10*3/mm3   Manual Differential    Collection Time: 04/09/21  3:27 PM    Specimen: Blood   Result Value Ref Range    Neutrophil % 70.0 42.7 - 76.0 %    Lymphocyte % 23.0 19.6 - 45.3 %    Monocyte % 6.0 5.0 - 12.0 %    Basophil % 1.0 0.0 - 1.5 %    Neutrophils Absolute 1.97 1.70 - 7.00 10*3/mm3    Lymphocytes Absolute 0.65 (L) 0.70 - 3.10 10*3/mm3    Monocytes Absolute 0.17 0.10 - 0.90 10*3/mm3    Basophils Absolute 0.03 0.00 - 0.20 10*3/mm3    RBC Morphology Normal Normal    WBC Morphology Normal Normal    Platelet Morphology Normal Normal   ECG 12 Lead    Collection Time: 04/09/21  3:36 PM   Result Value Ref Range    QT Interval 359 ms         RADIOLOGY RESULTS  No orders to display         PROGRESS, DATA ANALYSIS, CONSULTS AND MEDICAL DECISION MAKING  All labs have been independently reviewed by me.  All radiology studies have been reviewed by me and discussed with radiologist dictating the report.  EKG's independently viewed and interpreted by me unless stated otherwise. Discussion below represents my analysis of pertinent findings related to patient's condition, differential diagnosis, treatment plan and final disposition.     ED Course as of Apr 09 1725 Fri Apr 09, 2021   1602 Magnesium(!): 1.5 [EW]   1604 Ethanol(!): 320 [EW]   1604 ALT (SGPT)(!): 143 [EW]   1604 AST (SGOT)(!): 373 [EW]   1604 Total Bilirubin(!): 2.5 [EW]   1604 Anion Gap(!): 26.9 [EW]   1621 I had lengthy conversation with patient with regard to his alcoholism and goals.  He seems on one hand to have some denial and on the other hand is accepting. However, due to his abnormal labs and elevated liver enzymes with elevated bilirubin I am fearful to discharge him and he stop \"cold turkey\" as this would almost definitely bring on profound etoh withdrawal.  Will consult A for admission    [EW]   1721 Discussed admission with Dr. Rolle.  He agrees to admit to hospital for " further evaluation.     [EW]      ED Course User Index  [EW] Shabnam Rivera APRN     DDX: alcohol abuse, depression, dehydration, electrolyte abnormalities, alcohol withdrawal    MDM: Patient to be admitted for further work-up and management.  I have placed a consult for [facility] and hopefully they will come see him once he is more sober.  He is not having any seizure-like activity and is oriented and conversive.  He has no chest pain is not tachycardic and no shortness of breath.    Reviewed pt's history and workup with Dr. Salinas.  After a bedside evaluation, Dr. Salinas agrees with the plan of care.     Based on the patient's lab findings and presenting symptoms, the doctor and I feel it is appropriate to admit the patient for further management, evaluation, and treatment.  I have discussed this with the admitting team.  I have also discussed this with the patient/family.  They are in agreement with admission.          Disposition vitals:  /76   Pulse (!) 122   Resp 16   SpO2 97%       DIAGNOSIS  Final diagnoses:   Moderate dehydration   Alcohol use disorder, severe, dependence (CMS/HCC)   Elevated liver enzymes       Admission         Shabnam Rivera APRN  04/09/21 1742

## 2021-04-09 NOTE — H&P
Internal medicine history and physical  INTERNAL MEDICINE   [facility]       Patient Identification:  Name: Vince Lynne  Age: 40 y.o.  Sex: male  :  1980  MRN: [id number]                   Primary Care Physician: Vito Ortiz MD                               Date of admission:2021    Chief Complaint: Fall in the parking lot of [employer] by EMS and brought to the emergency room.    History of Present Illness:   Patient is a 40-year-old male who has past medical history remarkable for anxiety alcohol abuse has been drinking heavily and in that background patient had few more drinks today and was having to [employer] and last thing he remembers that he was in the emergency room he apparently passed out at the [employer] parking lot.  Patient denies any specific trauma or injury except for abrasion on the back of his scalp.  Patient currently denies any recent hematemesis or melena.  He initially told me that he is here because of his pain in the right foot that has been going on for months when I reminded him about him being at the parking lot and being unconscious and he was able to recollect and recall history.  He currently feels fine and just thirsty.  In the emergency room he was noted to have blood alcohol level of 320.      Past Medical History:  Past Medical History:   Diagnosis Date   • Anxiety    • Inguinal hernia    • Insomnia    • Irritable bowel syndrome 10/25/2011   • Obstructive sleep apnea 2015    NON COMPLIANT WITH CPAP     Past Surgical History:  Past Surgical History:   Procedure Laterality Date   • COLONOSCOPY N/A 2014    Normal ileum, normal colon, non-bleeding internal hemorrhoids-Dr. Edy Oviedo   • HERNIA REPAIR  2018   • INGUINAL HERNIA REPAIR Left 2018    Procedure: BILATERAL INGUINAL HERNIA REPAIR LAPAROSCOPIC;  Surgeon: Kylah Alvarez MD;  Location: [facility] OR [facility] [place];  Service: General   • KNEE ARTHROSCOPY W/ MENISCECTOMY Left        • UPPER  GASTROINTESTINAL ENDOSCOPY N/A 02/17/2014    LA Grade B reflux esophagitis, normal stomach, normal examined duodenum-Dr. Edy Oviedo      Home Meds:  (Not in a hospital admission)    Current Meds:     Current Facility-Administered Medications:   •  Insert peripheral IV, , , Once **AND** sodium chloride 0.9 % flush 10 mL, 10 mL, Intravenous, PRN, Miguel, Shabnam Juarez, APRN    Current Outpatient Medications:   •  cyclobenzaprine (FLEXERIL) 10 MG tablet, Take 1 tablet by mouth 2 (Two) Times a Day As Needed for Muscle Spasms., Disp: 60 tablet, Rfl: 2  •  diclofenac (VOLTAREN) 75 MG EC tablet, Take 1 tablet by mouth 2 (Two) Times a Day., Disp: 60 tablet, Rfl: 1  •  methylPREDNISolone (MEDROL) 4 MG tablet, follow package directions, Disp: 21 tablet, Rfl: 0  •  Multiple Vitamin (MULTI-VITAMIN DAILY PO), Take 1 tablet/day by mouth Daily. PT HOLDING FOR SURGERY, Disp: , Rfl:   •  zolpidem CR (AMBIEN CR) 12.5 MG CR tablet, Take 1 tablet by mouth At Night As Needed for Sleep., Disp: 30 tablet, Rfl: 2  Allergies:  No Known Allergies  Social History:   Social History     Tobacco Use   • Smoking status: Never Smoker   • Smokeless tobacco: Former User   Substance Use Topics   • Alcohol use: Yes     Comment: WEEKENDS      Family History:  Family History   Problem Relation Age of Onset   • Heart attack Mother    • Diabetes Father    • Malig Hyperthermia Neg Hx           Review of Systems  See history of present illness and past medical history.  Limited because of his forgetfulness.      Vitals:   /79   Pulse 93   Resp 21   SpO2 98%   I/O: No intake or output data in the 24 hours ending 04/09/21 1913  Exam:  Patient is examined using the personal protective equipment as per guidelines from infection control for this particular patient as enacted.  Hand washing was performed before and after patient interaction.  General Appearance:   Lethargic arousable interactive male who admits that he can not remember a lot of stuff.    Head:    Normocephalic, without obvious abnormality, atraumatic   Eyes:    PERRL, conjunctiva/corneas clear, EOM's intact, both eyes   Ears:    Normal external ear canals, both ears   Nose:   Nares normal, septum midline, mucosa normal, no drainage    or sinus tenderness   Throat:   Lips, tongue, gums normal; oral mucosa pink and moist   Neck:   Supple, symmetrical, trachea midline, no adenopathy;     thyroid:  no enlargement/tenderness/nodules; no carotid    bruit or JVD   Back:     Symmetric, no curvature, ROM normal, no CVA tenderness   Lungs:     Clear to auscultation bilaterally, respirations unlabored   Chest Wall:    No tenderness or deformity    Heart:    Regular rate and rhythm, S1 and S2 tachycardia   Abdomen:    Soft nontender no organomegaly detected   Extremities:   Extremities normal, atraumatic, no cyanosis or edema   Pulses:   Pulses palpable in all extremities; symmetric all extremities   Skin:   Skin color no acute skin lesions noted.   Neurologic:  History nonfocal neurological examination awake and oriented to his name and place.       Data Review:      I reviewed the patient's new clinical results.  Results from last 7 days   Lab Units 04/09/21  1527   WBC 10*3/mm3 2.81*   HEMOGLOBIN g/dL 13.0   PLATELETS 10*3/mm3 148     Results from last 7 days   Lab Units 04/09/21  1527   SODIUM mmol/L 137   POTASSIUM mmol/L 3.9   CHLORIDE mmol/L 91*   CO2 mmol/L 19.1*   BUN mg/dL 8   CREATININE mg/dL 0.97   CALCIUM mg/dL 8.0*   GLUCOSE mg/dL 131*     Microbiology Results (last 10 days)     Procedure Component Value - Date/Time    COVID PRE-OP / PRE-PROCEDURE SCREENING ORDER (NO ISOLATION) - Swab, Nasopharynx [[id number]]  (Normal) Collected: 04/09/21 1804    Lab Status: Final result Specimen: Swab from Nasopharynx Updated: 04/10/21 0018    Narrative:      The following orders were created for panel order COVID PRE-OP / PRE-PROCEDURE SCREENING ORDER (NO ISOLATION) - Swab, Nasopharynx.  Procedure                                Abnormality         Status                     ---------                               -----------         ------                     COVID-19,APTIMA PANTHER,...[[id number]]  Normal              Final result                 Please view results for these tests on the individual orders.    COVID-19,APTIMA PANTHER,[facility] IN-HOUSE, NP/OP SWAB IN UTM/VTM/SALINE TRANSPORT MEDIA,24 HR TAT - Swab, Nasopharynx [[id number]]  (Normal) Collected: 04/09/21 1804    Lab Status: Final result Specimen: Swab from Nasopharynx Updated: 04/10/21 0018     COVID19 Not Detected    Narrative:      Fact sheet for providers: https://www.fda.gov/media/224492/download     Fact sheet for patients: https://www.fda.gov/media/852767/download    Test performed by RT PCR.        No radiology results for the last day    Assessment:  Active Hospital Problems    Diagnosis  POA   • **Alcohol intoxication (CMS/HCC) [F10.929]  Unknown   • Moderate dehydration [E86.0]  Yes   • Alcohol abuse [F10.10]  Unknown   • Alcoholic hepatitis [K70.10]  Unknown   • Lumbar radiculopathy [M54.16]  Yes   • Obstructive sleep apnea [G47.33]  Yes       Medical decision making:  Alcohol intoxication manifesting itself by him being passed out at the [employer] parking lot and picked up by EMS.  Plan is to admit the patient watch for alcohol withdrawal as his intoxication resolves.  Provided with aspiration precautions.  CIWA protocol initiated.  Continue with IV Pepcid and thiamine.  Mildly elevated abnormalities in LFTs with alcoholic hepatitis-plan is to monitor.  Leukopenia likely multifactorial including possibility of evolving viral infection or marrow toxic effect of elevated alcohol level manifesting as leukopenia.  Plan is to observe his white blood cell count and sequelae of neutropenic sepsis while watching his white blood cell count improved.  Dehydration in the setting of alcohol use and not provide him with banana bag which he has received continue with IV  fluids electrolyte imbalance and placement electrolyte placement protocol.    Quique Rolle MD   4/9/2021  19:13 EDT  Much of this encounter note is an electronic transcription/translation of spoken language to printed text. The electronic translation of spoken language may permit erroneous, or at times, nonsensical words or phrases to be inadvertently transcribed; Although I have reviewed the note for such errors, some may still exist

## 2021-04-09 NOTE — ED NOTES
Pt to ED from [facility] via EMS. Pt was laying in the parking lot of [employer] beside someones car. He reports he drank 4 pints of bourbon. Pt denies drug use. He denies SI/HI, but is under the influence.    Police were on scene and advised pt to come here.    This RN wore goggles, mask, and gloves while in pts room. Pt wearing surgical mask.       Guillermina Lehman, RN  04/09/21 7379

## 2021-04-09 NOTE — ED PROVIDER NOTES
I have supervised the care provided by the midlevel provider.    We have discussed this patient's history, physical exam, and treatment plan.   I have reviewed the note and have personally examined the patient and agree with the plan of care.  See attached attending note.  My personal findings are below:    Patient was found down in the parking lot at [facility] after falling.  Patient reports drinking heavily for the past several days.  He denies headache, neck pain, chest pain, shortness of breath, nausea, vomiting, or abdominal pain.  He reports being depressed but denies suicidal ideation.    On exam: Awake alert.  There is a small abrasion on the exterior scalp.  Mucous membranes are dry.  C/T/L-spine are nontender.  Heart is regular but tachycardic.  Lungs are clear bilaterally.  Abdomen is soft and nontender.  Chest and back are nontender.  Extremities are nontender with full range of motion.  Follows commands.  Speech is normal.    Labs reviewed.  Alcohol is 320.  Anion gap is elevated.  LFTs are elevated.  Drug screen is pending.  Patient will be given a banana bag.    EKG          EKG time: 1536  Rhythm/Rate: Sinus tachycardia, rate 102  P waves and IN: Normal  QRS, axis: Normal  ST and T waves: Normal    Interpreted Contemporaneously by me, independently viewed  No prior available for comparison        Brooks Salinas MD  04/09/21 1448

## 2021-04-10 PROBLEM — E87.6 HYPOKALEMIA: Status: ACTIVE | Noted: 2021-04-10

## 2021-04-10 LAB
ALBUMIN SERPL-MCNC: 3.4 G/DL (ref 3.5–5.2)
ALBUMIN/GLOB SERPL: 1.7 G/DL
ALP SERPL-CCNC: 164 U/L (ref 39–117)
ALT SERPL W P-5'-P-CCNC: 112 U/L (ref 1–41)
ANION GAP SERPL CALCULATED.3IONS-SCNC: 15 MMOL/L (ref 5–15)
AST SERPL-CCNC: 306 U/L (ref 1–40)
BASOPHILS # BLD MANUAL: 0.06 10*3/MM3 (ref 0–0.2)
BASOPHILS NFR BLD AUTO: 2 % (ref 0–1.5)
BILIRUB SERPL-MCNC: 2.6 MG/DL (ref 0–1.2)
BUN SERPL-MCNC: 5 MG/DL (ref 6–20)
BUN/CREAT SERPL: 7.5 (ref 7–25)
CALCIUM SPEC-SCNC: 7.4 MG/DL (ref 8.6–10.5)
CHLORIDE SERPL-SCNC: 93 MMOL/L (ref 98–107)
CO2 SERPL-SCNC: 25 MMOL/L (ref 22–29)
CREAT SERPL-MCNC: 0.67 MG/DL (ref 0.76–1.27)
D-LACTATE SERPL-SCNC: 2.9 MMOL/L (ref 0.5–2)
D-LACTATE SERPL-SCNC: 3.1 MMOL/L (ref 0.5–2)
DEPRECATED RDW RBC AUTO: 55 FL (ref 37–54)
ERYTHROCYTE [DISTWIDTH] IN BLOOD BY AUTOMATED COUNT: 15.2 % (ref 12.3–15.4)
GFR SERPL CREATININE-BSD FRML MDRD: 131 ML/MIN/1.73
GLOBULIN UR ELPH-MCNC: 2 GM/DL
GLUCOSE SERPL-MCNC: 88 MG/DL (ref 65–99)
HCT VFR BLD AUTO: 30.4 % (ref 37.5–51)
HGB BLD-MCNC: 10.8 G/DL (ref 13–17.7)
LYMPHOCYTES # BLD MANUAL: 0.66 10*3/MM3 (ref 0.7–3.1)
LYMPHOCYTES NFR BLD MANUAL: 21.2 % (ref 19.6–45.3)
MCH RBC QN AUTO: 36.5 PG (ref 26.6–33)
MCHC RBC AUTO-ENTMCNC: 35.5 G/DL (ref 31.5–35.7)
MCV RBC AUTO: 102.7 FL (ref 79–97)
NEUTROPHILS # BLD AUTO: 2.4 10*3/MM3 (ref 1.7–7)
NEUTROPHILS NFR BLD MANUAL: 76.8 % (ref 42.7–76)
PLAT MORPH BLD: NORMAL
PLATELET # BLD AUTO: 96 10*3/MM3 (ref 140–450)
PMV BLD AUTO: 11.4 FL (ref 6–12)
POTASSIUM SERPL-SCNC: 3.5 MMOL/L (ref 3.5–5.2)
PROT SERPL-MCNC: 5.4 G/DL (ref 6–8.5)
RBC # BLD AUTO: 2.96 10*6/MM3 (ref 4.14–5.8)
RBC MORPH BLD: NORMAL
SARS-COV-2 ORF1AB RESP QL NAA+PROBE: NOT DETECTED
SMUDGE CELLS BLD QL SMEAR: ABNORMAL
SODIUM SERPL-SCNC: 133 MMOL/L (ref 136–145)
WBC # BLD AUTO: 3.13 10*3/MM3 (ref 3.4–10.8)

## 2021-04-10 PROCEDURE — G0378 HOSPITAL OBSERVATION PER HR: HCPCS

## 2021-04-10 PROCEDURE — 96376 TX/PRO/DX INJ SAME DRUG ADON: CPT

## 2021-04-10 PROCEDURE — 85025 COMPLETE CBC W/AUTO DIFF WBC: CPT | Performed by: INTERNAL MEDICINE

## 2021-04-10 PROCEDURE — 25810000003 SODIUM CHLORIDE 0.9 % WITH KCL 20 MEQ 20-0.9 MEQ/L-% SOLUTION: Performed by: INTERNAL MEDICINE

## 2021-04-10 PROCEDURE — 80053 COMPREHEN METABOLIC PANEL: CPT | Performed by: INTERNAL MEDICINE

## 2021-04-10 PROCEDURE — 25010000002 MAGNESIUM SULFATE IN D5W 1G/100ML (PREMIX) 1-5 GM/100ML-% SOLUTION: Performed by: INTERNAL MEDICINE

## 2021-04-10 PROCEDURE — 96375 TX/PRO/DX INJ NEW DRUG ADDON: CPT

## 2021-04-10 PROCEDURE — 90791 PSYCH DIAGNOSTIC EVALUATION: CPT

## 2021-04-10 PROCEDURE — 83605 ASSAY OF LACTIC ACID: CPT | Performed by: INTERNAL MEDICINE

## 2021-04-10 PROCEDURE — 85007 BL SMEAR W/DIFF WBC COUNT: CPT | Performed by: INTERNAL MEDICINE

## 2021-04-10 PROCEDURE — 96366 THER/PROPH/DIAG IV INF ADDON: CPT

## 2021-04-10 PROCEDURE — 96361 HYDRATE IV INFUSION ADD-ON: CPT

## 2021-04-10 RX ORDER — MAGNESIUM SULFATE 1 G/100ML
1 INJECTION INTRAVENOUS
Status: COMPLETED | OUTPATIENT
Start: 2021-04-10 | End: 2021-04-10

## 2021-04-10 RX ORDER — SODIUM CHLORIDE 9 MG/ML
150 INJECTION, SOLUTION INTRAVENOUS CONTINUOUS
Status: DISCONTINUED | OUTPATIENT
Start: 2021-04-10 | End: 2021-04-10

## 2021-04-10 RX ORDER — SODIUM CHLORIDE AND POTASSIUM CHLORIDE 150; 900 MG/100ML; MG/100ML
150 INJECTION, SOLUTION INTRAVENOUS CONTINUOUS
Status: DISCONTINUED | OUTPATIENT
Start: 2021-04-10 | End: 2021-04-11

## 2021-04-10 RX ADMIN — LORAZEPAM 2 MG: 1 TABLET ORAL at 11:23

## 2021-04-10 RX ADMIN — MAGNESIUM SULFATE HEPTAHYDRATE 1 G: 1 INJECTION, SOLUTION INTRAVENOUS at 13:00

## 2021-04-10 RX ADMIN — SODIUM CHLORIDE, PRESERVATIVE FREE 10 ML: 5 INJECTION INTRAVENOUS at 08:13

## 2021-04-10 RX ADMIN — Medication 1 TABLET: at 11:23

## 2021-04-10 RX ADMIN — SODIUM CHLORIDE 500 ML: 9 INJECTION, SOLUTION INTRAVENOUS at 08:12

## 2021-04-10 RX ADMIN — SODIUM CHLORIDE, PRESERVATIVE FREE 10 ML: 5 INJECTION INTRAVENOUS at 20:35

## 2021-04-10 RX ADMIN — LORAZEPAM 1 MG: 1 TABLET ORAL at 06:11

## 2021-04-10 RX ADMIN — LORAZEPAM 1 MG: 1 TABLET ORAL at 01:34

## 2021-04-10 RX ADMIN — FAMOTIDINE 20 MG: 10 INJECTION INTRAVENOUS at 01:32

## 2021-04-10 RX ADMIN — POTASSIUM CHLORIDE AND SODIUM CHLORIDE 150 ML/HR: 900; 150 INJECTION, SOLUTION INTRAVENOUS at 13:00

## 2021-04-10 RX ADMIN — FOLIC ACID 1 MG: 1 TABLET ORAL at 11:24

## 2021-04-10 RX ADMIN — FAMOTIDINE 20 MG: 10 INJECTION INTRAVENOUS at 20:20

## 2021-04-10 RX ADMIN — Medication 100 MG: at 11:24

## 2021-04-10 RX ADMIN — LORAZEPAM 1 MG: 1 TABLET ORAL at 17:36

## 2021-04-10 RX ADMIN — FAMOTIDINE 20 MG: 10 INJECTION INTRAVENOUS at 08:12

## 2021-04-10 RX ADMIN — MAGNESIUM SULFATE HEPTAHYDRATE 1 G: 1 INJECTION, SOLUTION INTRAVENOUS at 11:35

## 2021-04-10 NOTE — CONSULTS
Access consulted for depression. All information per patient report, unless otherwise noted.     Pt lying in bed and agreeable to talking. No mask on. Pt reports falling in a parking lot at [employer]. Per chart he has a CIWA of 6. Pt reports drinking a 5th of bourbon every other day. He has been drinking since college but it has been since the beginning of this year that he has been drinking that quantity. Pt reports quitting his job last August during a yousif. Pt states he does have a problem and did not think other thought he had a problem until today. Just today a friend told him that he thinks he has a problem.     Pt reports no tobacco use, THC, Meth, Cocaine, Heroin, Opiates, or Benzos. Pt reports he sleeps 8 hours with alcohol usage and doesn't sleep at all without it. Access noted in the chart he was prescribed Ambien in 2018 and pt reports that his script ran out March of 2020. Pt noted that it did help him sleep. Pt rates his depression at a 8 and anxiety at 8. He reports his appetite has not been very good since drinking again. He reports not remembering to eat.     Pt reports no SI, he does not wish to be dead. Pt does state that he does sometimes wish he wouldn't wake up but he reports no plan to hurt himself. Pt reports no HI.     Pt reports feeling safe at home. He lives home, alone and no children or significant other. He does report a support system of friends and family. He reports no  hx, no legal issues, and no Baptism. Pt reports no hx of violence. Pt reports no experienced trauma, witnessed violence, and no near death injury.     Full PPE, including mask and eyewear (gown and gloves when necessary), worn throughout encounter. Appropriate hand hygiene performed before and after patient contact and donning/doffing PPE.     Access educated pt on the trajectory with the use of alcohol. The medical implications of continuing to drink so much. Access offered resources for IOP, AA, Inpatient,  therapy, and psychiatrist. Access educated in detail these different options. The pt felt he would do best with AA. He was agreeable to being given handouts for all of the options but felt like AA would be best so noted the handout for that. Access will sign off.

## 2021-04-10 NOTE — ED NOTES
Nursing report ED to floor  Vince Lynne  40 y.o.  male    HPI (triage note):   Chief Complaint   Patient presents with   • Alcohol Intoxication       Admitting doctor:   Quique Rolle MD    Admitting diagnosis:   The primary encounter diagnosis was Moderate dehydration. Diagnoses of Alcohol use disorder, severe, dependence (CMS/HCC) and Elevated liver enzymes were also pertinent to this visit.    Code status:   Current Code Status     Date Active Code Status Order ID Comments User Context       4/9/2021 1915 CPR [id number]  Quique Rolle MD ED     Advance Care Planning Activity      Questions for Current Code Status     Question Answer Comment    Code Status CPR     Medical Interventions (Level of Support Prior to Arrest) Full           Allergies:   Patient has no known allergies.    Weight:   There were no vitals filed for this visit.    Most recent vitals:   Vitals:    04/09/21 1457 04/09/21 1740 04/09/21 1910   BP: 127/76 111/79 103/91   Pulse: (!) 122 93    Resp: 16 21    SpO2: 97% 98% 95%       Active LDAs/IV Access:   Lines, Drains & Airways    Active LDAs     Name:   Placement date:   Placement time:   Site:   Days:    Peripheral IV 04/09/21 1600 Right Antecubital   04/09/21    1600    Antecubital   less than 1                Labs (abnormal labs have a star):   Labs Reviewed   COMPREHENSIVE METABOLIC PANEL - Abnormal; Notable for the following components:       Result Value    Glucose 131 (*)     Chloride 91 (*)     CO2 19.1 (*)     Calcium 8.0 (*)     ALT (SGPT) 143 (*)     AST (SGOT) 373 (*)     Alkaline Phosphatase 197 (*)     Total Bilirubin 2.5 (*)     Anion Gap 26.9 (*)     All other components within normal limits    Narrative:     GFR Normal >60  Chronic Kidney Disease <60  Kidney Failure <15     ETHANOL - Abnormal; Notable for the following components:    Ethanol 320 (*)     All other components within normal limits   MAGNESIUM - Abnormal; Notable for the following components:    Magnesium 1.5 (*)      All other components within normal limits   CBC WITH AUTO DIFFERENTIAL - Abnormal; Notable for the following components:    WBC 2.81 (*)     RBC 3.53 (*)     Hematocrit 36.9 (*)     .5 (*)     MCH 36.8 (*)     RDW 15.6 (*)     RDW-SD 58.8 (*)     All other components within normal limits   MANUAL DIFFERENTIAL - Abnormal; Notable for the following components:    Lymphocytes Absolute 0.65 (*)     All other components within normal limits   PROTIME-INR - Normal   LIPASE - Normal   AMMONIA - Normal   COVID PRE-OP / PRE-PROCEDURE SCREENING ORDER (NO ISOLATION)    Narrative:     The following orders were created for panel order COVID PRE-OP / PRE-PROCEDURE SCREENING ORDER (NO ISOLATION) - Swab, Nasopharynx.  Procedure                               Abnormality         Status                     ---------                               -----------         ------                     COVID-19,APTIMA PANTHER,...[[id number]]                      In process                   Please view results for these tests on the individual orders.   COVID-19,APTIMA PANTHER,[facility] IN-HOUSE,NP/OP SWAB IN UTM/VTM/SALINE TRANSPORT MEDIA,24 HR TAT   URINALYSIS W/ MICROSCOPIC IF INDICATED (NO CULTURE)   URINE DRUG SCREEN   CBC AND DIFFERENTIAL    Narrative:     The following orders were created for panel order CBC & Differential.  Procedure                               Abnormality         Status                     ---------                               -----------         ------                     CBC Auto Differential[[id number]]        Abnormal            Final result                 Please view results for these tests on the individual orders.       EKG:   ECG 12 Lead   Final Result   HEART RATE= 102  bpm   RR Interval= 588  ms   NE Interval= 126  ms   P Horizontal Axis= -23  deg   P Front Axis= 75  deg   QRSD Interval= 95  ms   QT Interval= 359  ms   QRS Axis= 72  deg   T Wave Axis= 63  deg   - OTHERWISE NORMAL ECG -   Sinus  tachycardia   No change from prior tracing   Electronically Signed By: Betsy CamachoFl[facility]) 09-Apr-2021 17:51:18   Date and Time of Study: 2021-04-09 15:36:31          Meds given in ED:   Medications   sodium chloride 0.9 % flush 10 mL (has no administration in time range)   thiamine (B-1) 100 mg, folic acid 1 mg in sodium chloride 0.9 % 1,000 mL infusion (0 mL/hr Intravenous Stopped 4/9/21 1944)   magnesium sulfate 2g/50 mL (PREMIX) infusion (0 g Intravenous Stopped 4/9/21 1944)   PHENobarbital injection 260 mg (260 mg Intramuscular Given 4/9/21 1758)       Imaging results:  No radiology results for the last day    Ambulatory status:   - ad elijah    Social issues:   Social History     Socioeconomic History   • Marital status: Single     Spouse name: Not on file   • Number of children: Not on file   • Years of education: Not on file   • Highest education level: Not on file   Tobacco Use   • Smoking status: Never Smoker   • Smokeless tobacco: Former User   Substance and Sexual Activity   • Alcohol use: Yes     Comment: WEEKENDS   • Drug use: No   • Sexual activity: Yes     Partners: Female     Birth control/protection: Condom    Nursing report ED to floor       Young, Liv, RN  04/09/21 2014

## 2021-04-10 NOTE — PLAN OF CARE
Problem: Alcohol Withdrawal  Goal: Alcohol Withdrawal Symptom Control  Outcome: Ongoing, Progressing  Intervention: Minimize/Manage Alcohol Withdrawal Symptoms  Recent Flowsheet Documentation  Taken 4/10/2021 1202 by Vito Pitt, RN  Aspiration Precautions: awake/alert before oral intake  Taken 4/10/2021 1020 by Vito Pitt, RN  Aspiration Precautions: awake/alert before oral intake  Seizure Precautions:   clutter-free environment maintained   activity supervised  Goal: Alcohol Withdrawal Symptom Control  Outcome: Ongoing, Progressing  Intervention: Minimize/Manage Alcohol Withdrawal Symptoms  Recent Flowsheet Documentation  Taken 4/10/2021 1202 by Vito Pitt, RN  Aspiration Precautions: awake/alert before oral intake  Taken 4/10/2021 1020 by Vito Pitt RN  Aspiration Precautions: awake/alert before oral intake  Seizure Precautions:   clutter-free environment maintained   activity supervised   Goal Outcome Evaluation:     Progress: improving  Outcome Summary: MAXWELL's WNL, scheduled lorazapam to help control withdraw, education on S/S withdraw and what to expect provided, electrolyte managment throughout day. doing better, but withdraws haven't really hit yet, tomorrow should be worse.

## 2021-04-10 NOTE — PLAN OF CARE
Goal Outcome Evaluation:  Plan of Care Reviewed With: patient  Progress: improving  Outcome Summary: meds per order and request, no falls, see labs and vs

## 2021-04-11 ENCOUNTER — READMISSION MANAGEMENT (OUTPATIENT)
Dept: CALL CENTER | Facility: HOSPITAL | Age: 41
End: 2021-04-11

## 2021-04-11 VITALS
HEART RATE: 115 BPM | WEIGHT: 152 LBS | DIASTOLIC BLOOD PRESSURE: 99 MMHG | OXYGEN SATURATION: 98 % | TEMPERATURE: 97.9 F | BODY MASS INDEX: 23.86 KG/M2 | SYSTOLIC BLOOD PRESSURE: 131 MMHG | RESPIRATION RATE: 18 BRPM | HEIGHT: 67 IN

## 2021-04-11 LAB
ALBUMIN SERPL-MCNC: 3.2 G/DL (ref 3.5–5.2)
ALBUMIN/GLOB SERPL: 1.6 G/DL
ALP SERPL-CCNC: 139 U/L (ref 39–117)
ALT SERPL W P-5'-P-CCNC: 89 U/L (ref 1–41)
ANION GAP SERPL CALCULATED.3IONS-SCNC: 9.3 MMOL/L (ref 5–15)
AST SERPL-CCNC: 209 U/L (ref 1–40)
BASOPHILS # BLD MANUAL: 0.02 10*3/MM3 (ref 0–0.2)
BASOPHILS NFR BLD AUTO: 1.1 % (ref 0–1.5)
BILIRUB SERPL-MCNC: 2.7 MG/DL (ref 0–1.2)
BUN SERPL-MCNC: 2 MG/DL (ref 6–20)
BUN/CREAT SERPL: 3.7 (ref 7–25)
CALCIUM SPEC-SCNC: 7.2 MG/DL (ref 8.6–10.5)
CHLORIDE SERPL-SCNC: 101 MMOL/L (ref 98–107)
CO2 SERPL-SCNC: 25.7 MMOL/L (ref 22–29)
CREAT SERPL-MCNC: 0.54 MG/DL (ref 0.76–1.27)
DEPRECATED RDW RBC AUTO: 51 FL (ref 37–54)
ERYTHROCYTE [DISTWIDTH] IN BLOOD BY AUTOMATED COUNT: 14.1 % (ref 12.3–15.4)
GFR SERPL CREATININE-BSD FRML MDRD: >150 ML/MIN/1.73
GLOBULIN UR ELPH-MCNC: 2 GM/DL
GLUCOSE SERPL-MCNC: 110 MG/DL (ref 65–99)
HCT VFR BLD AUTO: 26.5 % (ref 37.5–51)
HGB BLD-MCNC: 9.8 G/DL (ref 13–17.7)
LYMPHOCYTES # BLD MANUAL: 0.71 10*3/MM3 (ref 0.7–3.1)
LYMPHOCYTES NFR BLD MANUAL: 2.1 % (ref 5–12)
LYMPHOCYTES NFR BLD MANUAL: 31.6 % (ref 19.6–45.3)
MCH RBC QN AUTO: 37.4 PG (ref 26.6–33)
MCHC RBC AUTO-ENTMCNC: 37 G/DL (ref 31.5–35.7)
MCV RBC AUTO: 101.1 FL (ref 79–97)
MONOCYTES # BLD AUTO: 0.05 10*3/MM3 (ref 0.1–0.9)
NEUTROPHILS # BLD AUTO: 1.47 10*3/MM3 (ref 1.7–7)
NEUTROPHILS NFR BLD MANUAL: 65.3 % (ref 42.7–76)
PLAT MORPH BLD: NORMAL
PLATELET # BLD AUTO: 65 10*3/MM3 (ref 140–450)
PMV BLD AUTO: 12.6 FL (ref 6–12)
POTASSIUM SERPL-SCNC: 3.7 MMOL/L (ref 3.5–5.2)
PROT SERPL-MCNC: 5.2 G/DL (ref 6–8.5)
RBC # BLD AUTO: 2.62 10*6/MM3 (ref 4.14–5.8)
RBC MORPH BLD: NORMAL
SODIUM SERPL-SCNC: 136 MMOL/L (ref 136–145)
WBC # BLD AUTO: 2.25 10*3/MM3 (ref 3.4–10.8)
WBC MORPH BLD: NORMAL

## 2021-04-11 PROCEDURE — 80053 COMPREHEN METABOLIC PANEL: CPT | Performed by: INTERNAL MEDICINE

## 2021-04-11 PROCEDURE — G0378 HOSPITAL OBSERVATION PER HR: HCPCS

## 2021-04-11 PROCEDURE — 96361 HYDRATE IV INFUSION ADD-ON: CPT

## 2021-04-11 PROCEDURE — 85025 COMPLETE CBC W/AUTO DIFF WBC: CPT | Performed by: INTERNAL MEDICINE

## 2021-04-11 PROCEDURE — 85007 BL SMEAR W/DIFF WBC COUNT: CPT | Performed by: INTERNAL MEDICINE

## 2021-04-11 RX ADMIN — LORAZEPAM 1 MG: 1 TABLET ORAL at 00:30

## 2021-04-11 RX ADMIN — Medication 100 MG: at 08:19

## 2021-04-11 RX ADMIN — FOLIC ACID 1 MG: 1 TABLET ORAL at 08:19

## 2021-04-11 RX ADMIN — Medication 1 TABLET: at 08:20

## 2021-04-11 NOTE — PLAN OF CARE
Problem: Alcohol Withdrawal  Goal: Alcohol Withdrawal Symptom Control  Outcome: Ongoing, Progressing  Intervention: Minimize/Manage Alcohol Withdrawal Symptoms  Recent Flowsheet Documentation  Taken 4/11/2021 0738 by Vito Pitt RN  Sensory Stimulation Regulation:   care clustered   quiet environment promoted  Aspiration Precautions: awake/alert before oral intake  Seizure Precautions:   activity supervised   clutter-free environment maintained  Intervention: Manage Addictive Behavior (Brief Intervention)  Recent Flowsheet Documentation  Taken 4/11/2021 0738 by Vito Pitt, RN  Supportive Measures:   active listening utilized   decision-making supported  Goal: Alcohol Withdrawal Symptom Control  Outcome: Ongoing, Progressing  Intervention: Minimize/Manage Alcohol Withdrawal Symptoms  Recent Flowsheet Documentation  Taken 4/11/2021 0738 by Vito Pitt, RN  Sensory Stimulation Regulation:   care clustered   quiet environment promoted  Aspiration Precautions: awake/alert before oral intake  Seizure Precautions:   activity supervised   clutter-free environment maintained  Intervention: Manage Addictive Behavior (Brief Intervention)  Recent Flowsheet Documentation  Taken 4/11/2021 0738 by Vito Pitt, RN  Supportive Measures:   active listening utilized   decision-making supported   Goal Outcome Evaluation:  Plan of Care Reviewed With: patient  Progress: improving  Outcome Summary: discharging home with family and education on withdraw and quiting

## 2021-04-11 NOTE — PLAN OF CARE
Goal Outcome Evaluation:  Plan of Care Reviewed With: patient  Progress: improving  Outcome Summary: CIWA's < 3 overnight. Pt pleasant, no c/o withdrawal symptoms. Ambulating to bathroom w/ assist x1. IV fliuds @150. No acute changes overnight. Will await AM labs. Will continue to monitor pt closely.

## 2021-04-11 NOTE — OUTREACH NOTE
Prep Survey      Responses   [facility] facility patient discharged from?  [place]   Is LACE score < 7 ?  Yes   Emergency Room discharge w/ pulse ox?  No   Eligibility  [facility]   Date of Admission  04/09/21   Date of Discharge  04/11/21   Discharge Disposition  Home or Self Care   Discharge diagnosis  EtOH Abuse with Intoxication,  dehydration, alcoholic hepatitis   Does the patient have one of the following disease processes/diagnoses(primary or secondary)?  Other   Does the patient have Home health ordered?  No   Is there a DME ordered?  No   Prep survey completed?  Yes          Cecile Jain RN

## 2021-04-11 NOTE — DISCHARGE INSTR - APPOINTMENTS
Have CBC blood work completed on Wednesday this coming week and follow-up with PCP afterwards.    CBC order attached to discharge

## 2021-04-11 NOTE — PROGRESS NOTES
Name: Vince Lynne ADMIT: 2021   : 1980  PCP: Vito Ortiz MD    MRN: [id number] LOS: 0 days   AGE/SEX: 40 y.o. male  ROOM: [facility]     Subjective   Subjective   CC: fall, EtOH detox  No acute events. Patient is overall feeling better. He still feels unsteady. Taking PO. No CP/dyspnea/f/c/n/v/d.     Objective   Objective   Vital Signs  Temp:  [97.7 °F (36.5 °C)-98.5 °F (36.9 °C)] 98.1 °F (36.7 °C)  Heart Rate:  [77-97] 81  Resp:  [18-20] 18  BP: (104-117)/(69-84) 111/84  SpO2:  [95 %-97 %] 95 %  on   ;   Device (Oxygen Therapy): room air  Body mass index is 23.81 kg/m².  Physical Exam  Vitals and nursing note reviewed.   Constitutional:       General: He is not in acute distress.     Appearance: He is not toxic-appearing or diaphoretic.   HENT:      Head: Normocephalic and atraumatic.      Nose: Nose normal.      Mouth/Throat:      Mouth: Mucous membranes are moist.      Pharynx: Oropharynx is clear.   Eyes:      Extraocular Movements: Extraocular movements intact.      Conjunctiva/sclera: Conjunctivae normal.      Pupils: Pupils are equal, round, and reactive to light.   Cardiovascular:      Rate and Rhythm: Normal rate and regular rhythm.      Heart sounds: Normal heart sounds.   Pulmonary:      Effort: Pulmonary effort is normal.      Breath sounds: Normal breath sounds.   Abdominal:      General: Bowel sounds are normal.      Palpations: Abdomen is soft.   Musculoskeletal:         General: No swelling or tenderness.      Cervical back: Normal range of motion and neck supple.   Skin:     General: Skin is warm and dry.      Capillary Refill: Capillary refill takes less than 2 seconds.   Neurological:      General: No focal deficit present.      Mental Status: He is alert and oriented to person, place, and time.   Psychiatric:         Mood and Affect: Mood normal.         Behavior: Behavior normal.         Results Review     I reviewed the patient's new clinical results.  Results from last 7  days   Lab Units 04/10/21  0410 04/09/21  1527   WBC 10*3/mm3 3.13* 2.81*   HEMOGLOBIN g/dL 10.8* 13.0   PLATELETS 10*3/mm3 96* 148     Results from last 7 days   Lab Units 04/10/21  0410 04/09/21  1527   SODIUM mmol/L 133* 137   POTASSIUM mmol/L 3.5 3.9   CHLORIDE mmol/L 93* 91*   CO2 mmol/L 25.0 19.1*   BUN mg/dL 5* 8   CREATININE mg/dL 0.67* 0.97   GLUCOSE mg/dL 88 131*   Estimated Creatinine Clearance: 141.4 mL/min (A) (by C-G formula based on SCr of 0.67 mg/dL (L)).  Results from last 7 days   Lab Units 04/10/21  0410 04/09/21  1527   ALBUMIN g/dL 3.40* 4.00   BILIRUBIN mg/dL 2.6* 2.5*   ALK PHOS U/L 164* 197*   AST (SGOT) U/L 306* 373*   ALT (SGPT) U/L 112* 143*     Results from last 7 days   Lab Units 04/10/21  0410 04/09/21  1527   CALCIUM mg/dL 7.4* 8.0*   ALBUMIN g/dL 3.40* 4.00   MAGNESIUM mg/dL  --  1.5*     Results from last 7 days   Lab Units 04/10/21  0707 04/10/21  0410   LACTATE mmol/L 3.1* 2.9*     COVID19   Date Value Ref Range Status   04/09/2021 Not Detected Not Detected - Ref. Range Final     No results found for: HGBA1C, POCGLU    MRI Lumbar Spine Without Contrast  Narrative: MRI OF THE LUMBAR SPINE WITHOUT CONTRAST 12/05/2016     CLINICAL HISTORY: Patient has chronic midline low back pain with  left-sided sciatica-radiculopathy.     TECHNIQUE: Sagittal T1, proton density and fat-suppressed T2-weighted  images were obtained of the lumbar spine. In addition, axial T2-weighted  images were obtained from L1 to S1, thin cut axial T1-weighted images  were obtained angled through the interspaces from L3 to S1.     FINDINGS: The distal thoracic cord and the conus is normal in signal  intensity. The conus terminates at the T12-L1 interspace level which is  normal.     The T11-12, T12-L1, L1-L2, L2-L3 and L3-L4 disc spaces and facets are  normal with no disc herniation, canal or foraminal narrowing is seen  from T11 to L4.     At L4-5, there is mild disc space narrowing, diffuse disc  desiccation,  minimal posterior disc bulge and mild bilateral facet overgrowth. There  is no canal narrowing. There is mild spurring into the right foramen  with mild right foraminal narrowing. There is a left posterior lateral  to foraminal annular tear with a left posterior lateral to foraminal  disc herniation. Herniated disc material extending along the left  posterior lateral to foraminal inferior body and endplate of L4 that  measures 11 x 4 mm in medial lateral, anterior posterior dimension, 8 mm  in craniocaudal dimension and abuts the anterior inferior medial aspect  of the left L4 nerve root as it extends from the inferior portion of the  left lateral recess of L4 into left neural foramen at L4-5 moderately  narrows the left foramen at L4-5.     At L5-S1, the disc space and facets are normal with no canal or lateral  recess narrowing. There is minimal bilateral foraminal narrowing.     Impression: 1. There is mild disc space narrowing, diffuse disc desiccation and mild  bilateral facet overgrowth at L4-5 with mild canal and right foraminal  narrowing at L4-5. There is an annular tear in the left posterior  lateral to foraminal to far left lateral annulus and there is left  posterior lateral to foraminal disc herniation with superior extension  of extruded disc material along the left posterior lateral to foraminal  inferior body and endplate of L4 that measures 11 x 4 mm in medial  lateral and anterior posterior dimension and up to 8 mm in craniocaudal  dimension presses on the anterior inferior medial aspect of the left L4  nerve root within the inferior portion of the left lateral recess of L4  and inferior medial aspect of the left L4 nerve root as it extends  through the left neural foramen at L4-5 and far left laterally  protruding disc material at L4-5 abuts the medial aspect of the left L4  nerve root lateral to the left foramen to the far left laterally at  L4-5.   2. The remainder of the lumbar  spine MRI is unremarkable.     The results were communicated to Dr. Vito Ortiz by telephone  12/06/2016 9:00 AM.     This report was finalized on 12/6/2016 5:53 PM by Dr. Marcos Choudhury MD.       Scheduled Medications  famotidine, 20 mg, Intravenous, Q12H  thiamine, 100 mg, Oral, Daily   And  multivitamin, 1 tablet, Oral, Daily   And  folic acid, 1 mg, Oral, Daily  LORazepam, 1 mg, Oral, Q6H   Followed by  [START ON 4/11/2021] LORazepam, 1 mg, Oral, Q8H  sodium chloride, 10 mL, Intravenous, Q12H    Infusions  sodium chloride 0.9 % with KCl 20 mEq, 150 mL/hr, Last Rate: 150 mL/hr (04/10/21 2022)    Diet  Diet Regular       Assessment/Plan     Active Hospital Problems    Diagnosis  POA   • **Alcohol intoxication (CMS/HCC) [F10.929]  Yes   • Hypokalemia [E87.6]  Yes   • Moderate dehydration [E86.0]  Yes   • Alcohol abuse [F10.10]  Yes   • Alcoholic hepatitis [K70.10]  Yes   • Lumbar radiculopathy [M54.16]  Yes   • Obstructive sleep apnea [G47.33]  Yes      Resolved Hospital Problems   No resolved problems to display.   Dehydration/Syncope/Latic Acidosis  - from EtOH    - continue IVF    Hypokalemia  - replace per protocol  - add KCL to IVF    EtOH Hepatitis  - mild, DF low no indication for steroids  - follow LFTs in AM    EtOH Abuse with Intoxication  - patient states he wants to quit  - will monitor on CIWA protocol  - Access consult    SCDs for DVT prophylaxis.  Full code.  Discussed with patient, nursing staff and ED provider.  Anticipate discharge home in 1-2 days.      Percy Casey MD  [facility]  04/10/21  21:21 EDT

## 2021-04-11 NOTE — DISCHARGE SUMMARY
Patient Name: Vince Lynne  : 1980  MRN: [id number]    Date of Admission: 2021  Date of Discharge:  2021  Primary Care Physician: Vito Ortiz MD      Chief Complaint:   Alcohol Intoxication      Discharge Diagnoses     Active Hospital Problems    Diagnosis  POA   • **Alcohol intoxication (CMS/HCC) [F10.929]  Yes   • Hypokalemia [E87.6]  Yes   • Moderate dehydration [E86.0]  Yes   • Alcohol abuse [F10.10]  Yes   • Alcoholic hepatitis [K70.10]  Yes   • Lumbar radiculopathy [M54.16]  Yes   • Obstructive sleep apnea [G47.33]  Yes      Resolved Hospital Problems   No resolved problems to display.        Hospital Course     Mr. Lynne is a 40 y.o. male with a history of alcohol abuse who presented to [facility] initially complaining of alcohol intoxication and falling repeatedly in [employer] parking lot.  Please see the admitting history and physical for further details.  He was found to have a BAL of 320 and was admitted to the hospital for further evaluation and treatment.  He was placed on alcohol detox protocol and seen by the access center.  He was given fluids, lorazepam and vitamin replacement therapy.  He has done relatively well and today states he feels much better.  Is currently oriented x3 and is expressing motivation to abstain from alcohol.  He was given information to follow-up with AA after discharge and he is agreeable to do so.  He is tolerating diet.  He ambulated with minimal assistance today in the hallway.  He has no evidence of bruising or bleeding.  His platelets are noted to be low which is, certainly due to his alcohol abuse.  He has not been on heparin products here.  No evidence of bleeding.  He seems well enough to be discharged but would like his platelet count to be rechecked in a few days.  I instructed him to get blood work on Wednesday and follow-up with PCP thereafter.      Day of Discharge     Subjective:  No new complaints today.  Feels well  enough to be discharged home.  Does have weakness in his legs but was able to ambulate with nursing assistance.    Physical Exam:  Temp:  [97.7 °F (36.5 °C)-98.5 °F (36.9 °C)] 97.9 °F (36.6 °C)  Heart Rate:  [] 115  Resp:  [18-20] 18  BP: (104-131)/(77-99) 131/99  Body mass index is 23.81 kg/m².  Physical Exam  Constitutional:       General: He is not in acute distress.     Appearance: He is not diaphoretic.   Cardiovascular:      Rate and Rhythm: Normal rate and regular rhythm.      Heart sounds: No murmur heard.     Pulmonary:      Effort: Pulmonary effort is normal.      Breath sounds: Normal breath sounds.   Abdominal:      General: Bowel sounds are normal. There is no distension.      Palpations: Abdomen is soft.      Tenderness: There is no abdominal tenderness.   Musculoskeletal:         General: Normal range of motion.   Skin:     General: Skin is warm and dry.   Neurological:      Mental Status: He is alert and oriented to person, place, and time.         Consultants     Consult Orders (all) (From admission, onward)     Start     Ordered    04/09/21 1726  Psych / Access Center  Once     Comments: Severe depression, bad etoh abuse, significant weight loss per friend   Provider:  (Not yet assigned)    04/09/21 1725    04/09/21 1626  [facility] (on-call MD unless specified) Details  Once     Specialty:  Hospitalist  Provider:  (Not yet assigned)    04/09/21 1625              Procedures     Imaging Results (All)     None            Pertinent Labs     Results from last 7 days   Lab Units 04/11/21  0414 04/10/21  0410 04/09/21  1527   WBC 10*3/mm3 2.25* 3.13* 2.81*   HEMOGLOBIN g/dL 9.8* 10.8* 13.0   PLATELETS 10*3/mm3 65* 96* 148     Results from last 7 days   Lab Units 04/11/21  0414 04/10/21  0410 04/09/21  1527   SODIUM mmol/L 136 133* 137   POTASSIUM mmol/L 3.7 3.5 3.9   CHLORIDE mmol/L 101 93* 91*   CO2 mmol/L 25.7 25.0 19.1*   BUN mg/dL 2* 5* 8   CREATININE mg/dL 0.54* 0.67* 0.97   GLUCOSE mg/dL 110* 88  131*   Estimated Creatinine Clearance: 175.4 mL/min (A) (by C-G formula based on SCr of 0.54 mg/dL (L)).  Results from last 7 days   Lab Units 04/11/21  0414 04/10/21  0410 04/09/21  1527   ALBUMIN g/dL 3.20* 3.40* 4.00   BILIRUBIN mg/dL 2.7* 2.6* 2.5*   ALK PHOS U/L 139* 164* 197*   AST (SGOT) U/L 209* 306* 373*   ALT (SGPT) U/L 89* 112* 143*     Results from last 7 days   Lab Units 04/11/21  0414 04/10/21  0410 04/09/21  1527   CALCIUM mg/dL 7.2* 7.4* 8.0*   ALBUMIN g/dL 3.20* 3.40* 4.00   MAGNESIUM mg/dL  --   --  1.5*     Results from last 7 days   Lab Units 04/09/21  1527   LIPASE U/L 39   AMMONIA umol/L 30             Invalid input(s): LDLCALC      Results from last 7 days   Lab Units 04/09/21  1804   COVID19  Not Detected       Test Results Pending at Discharge       Discharge Details        Discharge Medications      Patient Not Prescribed Medications Upon Discharge         No Known Allergies      Discharge Disposition:  Home or Self Care    Discharge Diet:  Diet Order   Procedures   • Diet Regular       Discharge Activity:   Activity Instructions     Activity as Tolerated            CODE STATUS:    Code Status and Medical Interventions:   Ordered at: 04/09/21 1915     Code Status:    CPR     Medical Interventions (Level of Support Prior to Arrest):    Full       No future appointments.  Additional Instructions for the Follow-ups that You Need to Schedule     CBC No Differential    Apr 14, 2021 (Approximate)      Release to patient: Immediate           Follow-up Information     Vito Ortiz MD Follow up in 1 week(s).    Specialty: Family Medicine  Contact information:  [address] [address] [address] [phone]                   Additional Instructions for the Follow-ups that You Need to Schedule     CBC No Differential    Apr 14, 2021 (Approximate)      Release to patient: Immediate             Cali Moseley MD  [facility]  04/11/21  08:10 EDT

## 2021-04-12 ENCOUNTER — TRANSITIONAL CARE MANAGEMENT TELEPHONE ENCOUNTER (OUTPATIENT)
Dept: CALL CENTER | Facility: HOSPITAL | Age: 41
End: 2021-04-12

## 2021-04-12 NOTE — OUTREACH NOTE
Call Center TCM Note      Responses   [facility] [place] patient discharged from?  [place]   Does the patient have one of the following disease processes/diagnoses(primary or secondary)?  Other   TCM attempt successful?  No   Unsuccessful attempts  Attempt 1 [outdated release]          Juan Diego Alvarez RN    4/12/2021, 16:12 EDT

## 2021-04-12 NOTE — OUTREACH NOTE
Call Center TCM Note      Responses   [facility] patient discharged from?  [place]   Does the patient have one of the following disease processes/diagnoses(primary or secondary)?  Other   TCM attempt successful?  No   Unsuccessful attempts  Attempt 2          Juan Diego Alvarez RN    4/12/2021, 16:26 EDT

## 2021-04-13 ENCOUNTER — TRANSITIONAL CARE MANAGEMENT TELEPHONE ENCOUNTER (OUTPATIENT)
Dept: CALL CENTER | Facility: HOSPITAL | Age: 41
End: 2021-04-13

## 2021-04-13 NOTE — OUTREACH NOTE
Call Center TCM Note      Responses   [facility] [place] patient discharged from?  [place]   Does the patient have one of the following disease processes/diagnoses(primary or secondary)?  Other   TCM attempt successful?  No   Unsuccessful attempts  Attempt 3   Wrap up additional comments  Unable to reach pt x 3 attempts for TCM call. Pt is not yet sched for TCM FWP with Dr Ortiz, and per d/c orders also needs to have labs 04/14/2021.          Anita Kim MA    4/13/2021, 16:23 EDT

## 2021-04-13 NOTE — PROGRESS NOTES
Case Management Discharge Note      Final Note: home         Selected Continued Care - Discharged on 4/11/2021 Admission date: 4/9/2021 - Discharge disposition: Home or Self Care    Destination    No services have been selected for the patient.              Durable Medical Equipment    No services have been selected for the patient.              Dialysis/Infusion    No services have been selected for the patient.              Home Medical Care    No services have been selected for the patient.              Therapy    No services have been selected for the patient.              Community Resources    No services have been selected for the patient.                  Transportation Services  Private: Car    Final Discharge Disposition Code: 01 - home or self-care

## 2021-04-20 ENCOUNTER — IMMUNIZATION (OUTPATIENT)
Dept: VACCINE CLINIC | Facility: HOSPITAL | Age: 41
End: 2021-04-20

## 2021-04-20 PROCEDURE — 0001A: CPT | Performed by: INTERNAL MEDICINE

## 2021-04-20 PROCEDURE — 91300 HC SARSCOV02 VAC 30MCG/0.3ML IM: CPT | Performed by: INTERNAL MEDICINE

## 2021-04-25 NOTE — PROGRESS NOTES
Transitional Care Follow Up Visit  Subjective     Vince Lynne is a 40 y.o. male who presents for a transitional care management visit.    Within 48 business hours after discharge our office contacted him via telephone to coordinate his care and needs.      I reviewed and discussed the details of that call along with the discharge summary, hospital problems, inpatient lab results, inpatient diagnostic studies, and consultation reports with Vince.     Current outpatient and discharge medications have been reconciled for the patient.  Reviewed by: Vito Ortiz MD      Date of TCM Phone Call 4/11/2021   [facility]   Date of Admission 4/9/2021   Date of Discharge 4/11/2021   Discharge Disposition Home or Self Care     Risk for Readmission (LACE) Score: 4 (4/11/2021  6:01 AM)      History of Present Illness   Course During Hospital Stay:      Date of Admission: 4/9/2021  Date of Discharge:  4/11/2021  Primary Care Physician: Vito Ortiz MD        Chief Complaint:   Alcohol Intoxication        Discharge Diagnoses            Active Hospital Problems     Diagnosis   POA   • **Alcohol intoxication (CMS/HCC) [F10.929]   Yes   • Hypokalemia [E87.6]   Yes   • Moderate dehydration [E86.0]   Yes   • Alcohol abuse [F10.10]   Yes   • Alcoholic hepatitis [K70.10]   Yes   • Lumbar radiculopathy [M54.16]   Yes   • Obstructive sleep apnea [G47.33]   Yes       Resolved Hospital Problems   No resolved problems to display.         Hospital Course      Mr. Lynne is a 40 y.o. male with a history of alcohol abuse who presented to [facility] initially complaining of alcohol intoxication and falling repeatedly in [employer] parking lot.  Please see the admitting history and physical for further details.  He was found to have a BAL of 320 and was admitted to the hospital for further evaluation and treatment.  He was placed on alcohol detox protocol and seen by the access center.  He was given fluids, lorazepam  and vitamin replacement therapy.  He has done relatively well and today states he feels much better.  Is currently oriented x3 and is expressing motivation to abstain from alcohol.  He was given information to follow-up with AA after discharge and he is agreeable to do so.  He is tolerating diet.  He ambulated with minimal assistance today in the hallway.  He has no evidence of bruising or bleeding.  His platelets are noted to be low which is, certainly due to his alcohol abuse.  He has not been on heparin products here.  No evidence of bleeding.  He seems well enough to be discharged but would like his platelet count to be rechecked in a few days.  I instructed him to get blood work on Wednesday and follow-up with PCP thereafter.    Current outpatient and discharge medications have been reconciled for the patient.  Reviewed by: Vito Ortiz MD     Pt also reports R>L foot pain off/on since January when he tripped getting out of the bathtub. Some days better than others. No swelling currently, but did initially with some bruising.     Lastly, since pt has stopped all ETOH, he is having a great deal of difficulty sleeping and requests a refill on his Ambien used prior.    The following portions of the patient's history were reviewed and updated as appropriate: allergies, current medications, past family history, past medical history, past social history, past surgical history and problem list.    Review of Systems   Constitutional: Negative for activity change, chills and fever.   Respiratory: Negative for cough.    Cardiovascular: Negative for chest pain.   Musculoskeletal:        Left foot pain   Psychiatric/Behavioral: Negative for dysphoric mood.       Objective   Physical Exam  Constitutional:       General: He is not in acute distress.     Appearance: He is well-developed.   Cardiovascular:      Rate and Rhythm: Normal rate and regular rhythm.   Pulmonary:      Effort: Pulmonary effort is normal.      Breath  sounds: Normal breath sounds.   Neurological:      Mental Status: He is alert and oriented to person, place, and time.   Psychiatric:         Behavior: Behavior normal.         Thought Content: Thought content normal.     Hospital records reviewed with pt confirming HPI.  XR Foot: ordered due to pain s/p fall, no comparison, read by me: WNL    Assessment/Plan   Diagnoses and all orders for this visit:    1. Alcohol abuse (Primary)  -     Comprehensive Metabolic Panel    2. Hospital discharge follow-up    3. Thrombocytopenia (CMS/HCC)  -     CBC & Differential    4. Pain in both feet  -     Uric Acid  -     XR Foot 3+ View Right (In Office)  -     Ambulatory Referral to Orthopedic Surgery    5. Primary insomnia  -     zolpidem (Ambien) 5 MG tablet; Take 1 tablet by mouth At Night As Needed for Sleep.  Dispense: 30 tablet; Refill: 2    Strongly encouraged AA and abstinence form ETOH.  Pt knows to never mix ETOH and Ambien.

## 2021-04-26 ENCOUNTER — OFFICE VISIT (OUTPATIENT)
Dept: FAMILY MEDICINE CLINIC | Facility: CLINIC | Age: 41
End: 2021-04-26

## 2021-04-26 VITALS
SYSTOLIC BLOOD PRESSURE: 103 MMHG | TEMPERATURE: 97.2 F | DIASTOLIC BLOOD PRESSURE: 72 MMHG | HEIGHT: 67 IN | RESPIRATION RATE: 16 BRPM | WEIGHT: 151 LBS | OXYGEN SATURATION: 96 % | BODY MASS INDEX: 23.7 KG/M2 | HEART RATE: 92 BPM

## 2021-04-26 DIAGNOSIS — M79.672 PAIN IN BOTH FEET: ICD-10-CM

## 2021-04-26 DIAGNOSIS — M79.671 PAIN IN BOTH FEET: ICD-10-CM

## 2021-04-26 DIAGNOSIS — D69.6 THROMBOCYTOPENIA (HCC): ICD-10-CM

## 2021-04-26 DIAGNOSIS — Z09 HOSPITAL DISCHARGE FOLLOW-UP: ICD-10-CM

## 2021-04-26 DIAGNOSIS — F10.10 ALCOHOL ABUSE: Primary | ICD-10-CM

## 2021-04-26 DIAGNOSIS — F51.01 PRIMARY INSOMNIA: Chronic | ICD-10-CM

## 2021-04-26 PROCEDURE — 73630 X-RAY EXAM OF FOOT: CPT | Performed by: FAMILY MEDICINE

## 2021-04-26 PROCEDURE — 99214 OFFICE O/P EST MOD 30 MIN: CPT | Performed by: FAMILY MEDICINE

## 2021-04-26 RX ORDER — ZOLPIDEM TARTRATE 5 MG/1
5 TABLET ORAL NIGHTLY PRN
Qty: 30 TABLET | Refills: 2 | Status: SHIPPED | OUTPATIENT
Start: 2021-04-26 | End: 2021-09-13

## 2021-04-26 RX ORDER — ESCITALOPRAM OXALATE 10 MG/1
TABLET ORAL
COMMUNITY
Start: 2021-04-24 | End: 2021-09-14

## 2021-04-27 ENCOUNTER — TELEPHONE (OUTPATIENT)
Dept: FAMILY MEDICINE CLINIC | Facility: CLINIC | Age: 41
End: 2021-04-27

## 2021-04-27 LAB
ALBUMIN SERPL-MCNC: 4.1 G/DL (ref 3.5–5.2)
ALBUMIN/GLOB SERPL: 1.8 G/DL
ALP SERPL-CCNC: 61 U/L (ref 39–117)
ALT SERPL-CCNC: 25 U/L (ref 1–41)
AST SERPL-CCNC: 33 U/L (ref 1–40)
BASOPHILS # BLD MANUAL: 0.08 10*3/MM3 (ref 0–0.2)
BASOPHILS NFR BLD MANUAL: 2.1 % (ref 0–1.5)
BILIRUB SERPL-MCNC: 0.5 MG/DL (ref 0–1.2)
BUN SERPL-MCNC: 14 MG/DL (ref 6–20)
BUN/CREAT SERPL: 23.7 (ref 7–25)
CALCIUM SERPL-MCNC: 10.2 MG/DL (ref 8.6–10.5)
CHLORIDE SERPL-SCNC: 105 MMOL/L (ref 98–107)
CO2 SERPL-SCNC: 29 MMOL/L (ref 22–29)
CREAT SERPL-MCNC: 0.59 MG/DL (ref 0.76–1.27)
DIFFERENTIAL COMMENT: ABNORMAL
ERYTHROCYTE [DISTWIDTH] IN BLOOD BY AUTOMATED COUNT: 13.6 % (ref 12.3–15.4)
GLOBULIN SER CALC-MCNC: 2.3 GM/DL
GLUCOSE SERPL-MCNC: 95 MG/DL (ref 65–99)
HCT VFR BLD AUTO: 37.4 % (ref 37.5–51)
HGB BLD-MCNC: 12.8 G/DL (ref 13–17.7)
LYMPHOCYTES # BLD MANUAL: 1.12 10*3/MM3 (ref 0.7–3.1)
LYMPHOCYTES NFR BLD MANUAL: 29.9 % (ref 19.6–45.3)
MCH RBC QN AUTO: 36.4 PG (ref 26.6–33)
MCHC RBC AUTO-ENTMCNC: 34.2 G/DL (ref 31.5–35.7)
MCV RBC AUTO: 106.3 FL (ref 79–97)
MONOCYTES # BLD MANUAL: 0.42 10*3/MM3 (ref 0.1–0.9)
MONOCYTES NFR BLD MANUAL: 11.3 % (ref 5–12)
NEUTROPHILS # BLD MANUAL: 2.13 10*3/MM3 (ref 1.7–7)
NEUTROPHILS NFR BLD MANUAL: 56.7 % (ref 42.7–76)
NRBC BLD AUTO-RTO: 0 /100 WBC (ref 0–0.2)
PLATELET # BLD AUTO: 388 10*3/MM3 (ref 140–450)
PLATELET BLD QL SMEAR: ABNORMAL
POTASSIUM SERPL-SCNC: 4.5 MMOL/L (ref 3.5–5.2)
PROT SERPL-MCNC: 6.4 G/DL (ref 6–8.5)
RBC # BLD AUTO: 3.52 10*6/MM3 (ref 4.14–5.8)
RBC MORPH BLD: ABNORMAL
SODIUM SERPL-SCNC: 141 MMOL/L (ref 136–145)
URATE SERPL-MCNC: 5.7 MG/DL (ref 3.4–7)
WBC # BLD AUTO: 3.75 10*3/MM3 (ref 3.4–10.8)

## 2021-04-27 NOTE — TELEPHONE ENCOUNTER
Radiology called and DOES think pt has a fracture of the ankle that developed when he fell in January. Pt was scheduled to see ortho foot/ankle when here yesterday and will call and encourage him to keep that appt.

## 2021-05-04 ENCOUNTER — TELEPHONE (OUTPATIENT)
Dept: FAMILY MEDICINE CLINIC | Facility: CLINIC | Age: 41
End: 2021-05-04

## 2021-05-04 NOTE — TELEPHONE ENCOUNTER
PT CALLED STATING THE SPECIALISTS HE WENT TO FOR HIS FOOT DID HIS OWN XRAY AND SAID THERE WAS NOT A FRACTURE.

## 2021-05-11 ENCOUNTER — APPOINTMENT (OUTPATIENT)
Dept: VACCINE CLINIC | Facility: HOSPITAL | Age: 41
End: 2021-05-11

## 2021-05-14 ENCOUNTER — IMMUNIZATION (OUTPATIENT)
Dept: VACCINE CLINIC | Facility: HOSPITAL | Age: 41
End: 2021-05-14

## 2021-05-14 PROCEDURE — 91300 HC SARSCOV02 VAC 30MCG/0.3ML IM: CPT | Performed by: INTERNAL MEDICINE

## 2021-05-14 PROCEDURE — 0002A: CPT | Performed by: INTERNAL MEDICINE

## 2021-05-15 ENCOUNTER — APPOINTMENT (OUTPATIENT)
Dept: VACCINE CLINIC | Facility: HOSPITAL | Age: 41
End: 2021-05-15

## 2021-08-07 ENCOUNTER — HOSPITAL ENCOUNTER (EMERGENCY)
Facility: HOSPITAL | Age: 41
Discharge: HOME | End: 2021-08-07
Attending: EMERGENCY MEDICINE | Admitting: EMERGENCY MEDICINE

## 2021-08-07 VITALS
BODY MASS INDEX: 24.25 KG/M2 | TEMPERATURE: 97.7 F | HEIGHT: 68 IN | SYSTOLIC BLOOD PRESSURE: 118 MMHG | HEART RATE: 97 BPM | WEIGHT: 160 LBS | DIASTOLIC BLOOD PRESSURE: 81 MMHG | RESPIRATION RATE: 17 BRPM | OXYGEN SATURATION: 97 %

## 2021-08-07 DIAGNOSIS — F10.920 ALCOHOLIC INTOXICATION WITHOUT COMPLICATION (HCC): Primary | ICD-10-CM

## 2021-08-07 LAB
ALBUMIN SERPL-MCNC: 4.8 G/DL (ref 3.5–5.2)
ALBUMIN/GLOB SERPL: 1.8 G/DL
ALP SERPL-CCNC: 87 U/L (ref 39–117)
ALT SERPL W P-5'-P-CCNC: 330 U/L (ref 1–41)
ANION GAP SERPL CALCULATED.3IONS-SCNC: 23.5 MMOL/L (ref 5–15)
AST SERPL-CCNC: 482 U/L (ref 1–40)
BASOPHILS # BLD AUTO: 0.06 10*3/MM3 (ref 0–0.2)
BASOPHILS NFR BLD AUTO: 3 % (ref 0–1.5)
BILIRUB SERPL-MCNC: 1.3 MG/DL (ref 0–1.2)
BUN SERPL-MCNC: 7 MG/DL (ref 6–20)
BUN/CREAT SERPL: 8.2 (ref 7–25)
CALCIUM SPEC-SCNC: 9.1 MG/DL (ref 8.6–10.5)
CHLORIDE SERPL-SCNC: 92 MMOL/L (ref 98–107)
CO2 SERPL-SCNC: 21.5 MMOL/L (ref 22–29)
CREAT SERPL-MCNC: 0.85 MG/DL (ref 0.76–1.27)
DEPRECATED RDW RBC AUTO: 54.6 FL (ref 37–54)
EOSINOPHIL # BLD AUTO: 0 10*3/MM3 (ref 0–0.4)
EOSINOPHIL NFR BLD AUTO: 0 % (ref 0.3–6.2)
ERYTHROCYTE [DISTWIDTH] IN BLOOD BY AUTOMATED COUNT: 15.4 % (ref 12.3–15.4)
ETHANOL BLD-MCNC: 306 MG/DL (ref 0–10)
ETHANOL UR QL: 0.31 %
GFR SERPL CREATININE-BSD FRML MDRD: 99 ML/MIN/1.73
GLOBULIN UR ELPH-MCNC: 2.6 GM/DL
GLUCOSE SERPL-MCNC: 150 MG/DL (ref 65–99)
HCT VFR BLD AUTO: 42.5 % (ref 37.5–51)
HGB BLD-MCNC: 14.6 G/DL (ref 13–17.7)
IMM GRANULOCYTES # BLD AUTO: 0 10*3/MM3 (ref 0–0.05)
IMM GRANULOCYTES NFR BLD AUTO: 0 % (ref 0–0.5)
LYMPHOCYTES # BLD AUTO: 0.69 10*3/MM3 (ref 0.7–3.1)
LYMPHOCYTES NFR BLD AUTO: 34.3 % (ref 19.6–45.3)
MCH RBC QN AUTO: 32.9 PG (ref 26.6–33)
MCHC RBC AUTO-ENTMCNC: 34.4 G/DL (ref 31.5–35.7)
MCV RBC AUTO: 95.7 FL (ref 79–97)
MONOCYTES # BLD AUTO: 0.22 10*3/MM3 (ref 0.1–0.9)
MONOCYTES NFR BLD AUTO: 10.9 % (ref 5–12)
NEUTROPHILS NFR BLD AUTO: 1.04 10*3/MM3 (ref 1.7–7)
NEUTROPHILS NFR BLD AUTO: 51.8 % (ref 42.7–76)
NRBC BLD AUTO-RTO: 0 /100 WBC (ref 0–0.2)
PLATELET # BLD AUTO: 109 10*3/MM3 (ref 140–450)
PMV BLD AUTO: 11.3 FL (ref 6–12)
POTASSIUM SERPL-SCNC: 4 MMOL/L (ref 3.5–5.2)
PROT SERPL-MCNC: 7.4 G/DL (ref 6–8.5)
RBC # BLD AUTO: 4.44 10*6/MM3 (ref 4.14–5.8)
SODIUM SERPL-SCNC: 137 MMOL/L (ref 136–145)
WBC # BLD AUTO: 2.01 10*3/MM3 (ref 3.4–10.8)

## 2021-08-07 PROCEDURE — 99283 EMERGENCY DEPT VISIT LOW MDM: CPT

## 2021-08-07 PROCEDURE — 96365 THER/PROPH/DIAG IV INF INIT: CPT

## 2021-08-07 PROCEDURE — 82077 ASSAY SPEC XCP UR&BREATH IA: CPT | Performed by: PHYSICIAN ASSISTANT

## 2021-08-07 PROCEDURE — 96375 TX/PRO/DX INJ NEW DRUG ADDON: CPT

## 2021-08-07 PROCEDURE — 25010000002 LORAZEPAM PER 2 MG: Performed by: EMERGENCY MEDICINE

## 2021-08-07 PROCEDURE — 80053 COMPREHEN METABOLIC PANEL: CPT | Performed by: PHYSICIAN ASSISTANT

## 2021-08-07 PROCEDURE — 85025 COMPLETE CBC W/AUTO DIFF WBC: CPT | Performed by: PHYSICIAN ASSISTANT

## 2021-08-07 PROCEDURE — 25010000002 THIAMINE PER 100 MG: Performed by: PHYSICIAN ASSISTANT

## 2021-08-07 RX ORDER — LORAZEPAM 2 MG/ML
1 INJECTION INTRAMUSCULAR ONCE
Status: COMPLETED | OUTPATIENT
Start: 2021-08-07 | End: 2021-08-07

## 2021-08-07 RX ADMIN — LORAZEPAM 1 MG: 2 INJECTION INTRAMUSCULAR; INTRAVENOUS at 13:25

## 2021-08-07 RX ADMIN — FOLIC ACID 1000 ML/HR: 5 INJECTION, SOLUTION INTRAMUSCULAR; INTRAVENOUS; SUBCUTANEOUS at 13:39

## 2021-08-07 NOTE — ED PROVIDER NOTES
"EMERGENCY DEPARTMENT ENCOUNTER    Room Number:  08/08  Date seen:  8/8/2021  Time seen: 12:56 EDT  PCP: Vito Ortiz MD  Historian: Patient    HPI:  Chief complaint: Alcohol problem  A complete HPI/ROS/PMH/PSH/SH/FH are unobtainable due to: None  Context:Vince Lynne is a 41 y.o. male, who presents to the ED with c/o drinking bourbon almost every day for several years when he [facility] his [facility] but there was a 2-hour wait and went home.  He reports that his mother dropped him off here in the emergency room today to seek help for his alcohol problem.  He reports that he drank about half 1/5 of bourbon at 9 AM this morning.  Denies any SI or HI.  When asked if he would like this afternoon alcohol, he shrugs and says \"I guess so.\"    Patient was placed in face mask in first look. Patient was wearing facemask when I entered the room and throughout our encounter. I wore full protective equipment throughout this patient encounter including a face mask, goggles, and gloves. Hand hygiene was performed before donning protective equipment and after removal when leaving the room.    MEDICAL RECORD REVIEW      ALLERGIES  Patient has no known allergies.    PAST MEDICAL HISTORY  Active Ambulatory Problems     Diagnosis Date Noted   • Impaired fasting glucose 06/30/2014   • Insomnia    • Irritable bowel syndrome 10/25/2011   • Obstructive sleep apnea 09/01/2015   • Lumbar radiculopathy 12/09/2016   • Left inguinal hernia 05/23/2018   • Moderate dehydration 04/09/2021   • Alcohol abuse 04/09/2021   • Alcohol intoxication (CMS/HCC) 04/09/2021   • Alcoholic hepatitis 04/09/2021   • Hypokalemia 04/10/2021     Resolved Ambulatory Problems     Diagnosis Date Noted   • No Resolved Ambulatory Problems     Past Medical History:   Diagnosis Date   • Anxiety    • Inguinal hernia        PAST SURGICAL HISTORY  Past Surgical History:   Procedure Laterality Date   • COLONOSCOPY N/A " 02/17/2014    Normal ileum, normal colon, non-bleeding internal hemorrhoids-Dr. Edy Oviedo   • HERNIA REPAIR  7/19/2018   • INGUINAL HERNIA REPAIR Left 7/19/2018    Procedure: BILATERAL INGUINAL HERNIA REPAIR LAPAROSCOPIC;  Surgeon: Kylah Alvarez MD;  [place] [facility] [place] [facility] [place]  Service: General   • KNEE ARTHROSCOPY W/ MENISCECTOMY Left     2006   • UPPER GASTROINTESTINAL ENDOSCOPY N/A 02/17/2014    LA Grade B reflux esophagitis, normal stomach, normal examined duodenum-Dr. Edy Oviedo       FAMILY HISTORY  Family History   Problem Relation Age of Onset   • Heart attack Mother    • Diabetes Father    • Malig Hyperthermia Neg Hx        SOCIAL HISTORY  Social History     Socioeconomic History   • Marital status: Single     Spouse name: Not on file   • Number of children: Not on file   • Years of education: Not on file   • Highest education level: Not on file   Tobacco Use   • Smoking status: Never Smoker   • Smokeless tobacco: Former User   Substance and Sexual Activity   • Alcohol use: Yes     Comment: WEEKENDS   • Drug use: No   • Sexual activity: Yes     Partners: Female     Birth control/protection: Condom       REVIEW OF SYSTEMS  Review of Systems    All systems reviewed and negative except for those discussed in HPI.     PHYSICAL EXAM    ED Triage Vitals   Temp Heart Rate Resp BP SpO2   08/07/21 1234 08/07/21 1234 08/07/21 1234 08/07/21 1237 08/07/21 1234   97.7 °F (36.5 °C) (!) 135 18 110/60 97 %      Temp src Heart Rate Source Patient Position BP Location FiO2 (%)   08/07/21 1234 -- -- -- --   Tympanic         Physical Exam    I have reviewed the triage vital signs and nursing notes.      GENERAL: not distressed, smells of alcohol, speech is clear and fluent  HENT: nares patent  EYES: no scleral icterus  NECK: no ROM limitations  CV: regular rhythm, mild tachycardia rate 105  RESPIRATORY: normal effort, clear auscultation bilaterally  ABDOMEN: soft, nontender  : deferred  MUSCULOSKELETAL: no  deformity  NEURO: alert, moves all extremities, follows commands  SKIN: warm, dry    LAB RESULTS  Recent Results (from the past 24 hour(s))   Comprehensive Metabolic Panel    Collection Time: 08/07/21  1:14 PM    Specimen: Blood   Result Value Ref Range    Glucose 150 (H) 65 - 99 mg/dL    BUN 7 6 - 20 mg/dL    Creatinine 0.85 0.76 - 1.27 mg/dL    Sodium 137 136 - 145 mmol/L    Potassium 4.0 3.5 - 5.2 mmol/L    Chloride 92 (L) 98 - 107 mmol/L    CO2 21.5 (L) 22.0 - 29.0 mmol/L    Calcium 9.1 8.6 - 10.5 mg/dL    Total Protein 7.4 6.0 - 8.5 g/dL    Albumin 4.80 3.50 - 5.20 g/dL    ALT (SGPT) 330 (H) 1 - 41 U/L    AST (SGOT) 482 (H) 1 - 40 U/L    Alkaline Phosphatase 87 39 - 117 U/L    Total Bilirubin 1.3 (H) 0.0 - 1.2 mg/dL    eGFR Non African Amer 99 >60 mL/min/1.73    Globulin 2.6 gm/dL    A/G Ratio 1.8 g/dL    BUN/Creatinine Ratio 8.2 7.0 - 25.0    Anion Gap 23.5 (H) 5.0 - 15.0 mmol/L   Ethanol    Collection Time: 08/07/21  1:14 PM    Specimen: Blood   Result Value Ref Range    Ethanol 306 (H) 0 - 10 mg/dL    Ethanol % 0.306 %   CBC Auto Differential    Collection Time: 08/07/21  1:14 PM    Specimen: Blood   Result Value Ref Range    WBC 2.01 (L) 3.40 - 10.80 10*3/mm3    RBC 4.44 4.14 - 5.80 10*6/mm3    Hemoglobin 14.6 13.0 - 17.7 g/dL    Hematocrit 42.5 37.5 - 51.0 %    MCV 95.7 79.0 - 97.0 fL    MCH 32.9 26.6 - 33.0 pg    MCHC 34.4 31.5 - 35.7 g/dL    RDW 15.4 12.3 - 15.4 %    RDW-SD 54.6 (H) 37.0 - 54.0 fl    MPV 11.3 6.0 - 12.0 fL    Platelets 109 (L) 140 - 450 10*3/mm3    Neutrophil % 51.8 42.7 - 76.0 %    Lymphocyte % 34.3 19.6 - 45.3 %    Monocyte % 10.9 5.0 - 12.0 %    Eosinophil % 0.0 (L) 0.3 - 6.2 %    Basophil % 3.0 (H) 0.0 - 1.5 %    Immature Grans % 0.0 0.0 - 0.5 %    Neutrophils, Absolute 1.04 (L) 1.70 - 7.00 10*3/mm3    Lymphocytes, Absolute 0.69 (L) 0.70 - 3.10 10*3/mm3    Monocytes, Absolute 0.22 0.10 - 0.90 10*3/mm3    Eosinophils, Absolute 0.00 0.00 - 0.40 10*3/mm3    Basophils, Absolute 0.06 0.00  - 0.20 10*3/mm3    Immature Grans, Absolute 0.00 0.00 - 0.05 10*3/mm3    nRBC 0.0 0.0 - 0.2 /100 WBC         RADIOLOGY RESULTS  No orders to display         PROGRESS, DATA ANALYSIS, CONSULTS AND MEDICAL DECISION MAKING  All labs have been independently reviewed by me.  All radiology studies have been reviewed by me and discussed with radiologist dictating the report. Discussion below represents my analysis of pertinent findings related to patient's condition, differential diagnosis, treatment plan and final disposition.     ED Course as of Aug 08 1031   Sat Aug 07, 2021   1328 Ethanol(!): 306 [SS]   1514 I rechecked patient and his heart rate has improved to 96.  Banana bag is still running.  He has been given outpatient resources regarding his alcohol abuse.  Mother is now at bedside.  Patient is still denying any SI or HI.    [SS]      ED Course User Index  [SS] Clare Mckeon PA-C       The differential diagnosis include but are not limited to alcohol intoxication, alcohol withdrawal, SI.         Reviewed pt's history and workup with Dr. Salinas.  After a bedside evaluation, Dr. Salinas agrees with the plan of care.    (FOR DISCHARGE)The patient's history, physical exam, and lab findings were discussed with the physician, who also performed a face to face history and physical exam.  I discussed all results and noted any abnormalities with patient.  Discussed absoute need to recheck abnormalities with their family physician.  I answered any of the patient's questions.  Discussed plan for discharge, as there is no emergent indication for admission.  Pt is agreeable and understands need for follow up and repeat testing.  Pt is aware that discharge does not mean that nothing is wrong but it indicates no emergency is present and they must continue care with their family physician.  Pt is discharged with instructions to follow up with primary care doctor to have their blood pressure rechecked.           Disposition  "vitals:  /81   Pulse 97   Temp 97.7 °F (36.5 °C) (Tympanic)   Resp 17   Ht 172.7 cm (68\")   Wt 72.6 kg (160 lb)   SpO2 97%   BMI 24.33 kg/m²       DIAGNOSIS  Final diagnoses:   Alcoholic intoxication without complication (CMS/HCC)       FOLLOW UP   Vito Ortiz MD  [address]  [facility] [address] [phone]          [facility] Emergency Department  [address] [facility] [address] [phone]    As needed, If symptoms worsen         Clare Mckeon PA-C  08/08/21 1031    "

## 2021-08-07 NOTE — ED TRIAGE NOTES
Pt detoxing from alcohol, last drink around 0900, states drink fifth of liquor daily. [facility] to [facility] not [facility] him. Pt has tremor in hands and vomiting.

## 2021-08-07 NOTE — DISCHARGE INSTRUCTIONS
Please refer to the outpatient resources regarding close follow-up of your alcohol abuse. Return to the ER if you  develop any concerning or worsening symptoms.    Patient is medically cleared to go to [facility] after discharge.

## 2021-09-13 NOTE — PROGRESS NOTES
"Subjective   Vince Lynne is a 41 y.o. male.     CC: Back Pain    History of Present Illness     Pt comes in today reporting a roughly 2 week h/o LBP after jumping off the back of his truck, landing on the right leg, causing right lower back \"tension\" and soreness. Stretches and goes to the gym but won't go away. Pt with brie eh/o back issues prior. Tried Advil with mild relief.      The following portions of the patient's history were reviewed and updated as appropriate: allergies, current medications, past family history, past medical history, past social history, past surgical history and problem list.    Review of Systems   Constitutional: Negative for activity change, chills and fever.   Respiratory: Negative for cough.    Cardiovascular: Negative for chest pain.   Musculoskeletal: Positive for back pain.   Neurological: Negative for weakness and numbness.   Psychiatric/Behavioral: Negative for dysphoric mood.       /78   Pulse 90   Temp 97 °F (36.1 °C) (Oral)   Resp 18   Ht 172.7 cm (68\")   Wt 77.6 kg (171 lb)   BMI 26.00 kg/m²     Objective   Physical Exam  Constitutional:       General: He is not in acute distress.     Appearance: He is well-developed.   Pulmonary:      Effort: Pulmonary effort is normal.   Musculoskeletal:         General: Tenderness (lower right lumbar paraspinals; negative SLR.) present.   Neurological:      Mental Status: He is alert and oriented to person, place, and time.   Psychiatric:         Behavior: Behavior normal.         Thought Content: Thought content normal.         Assessment/Plan   Diagnoses and all orders for this visit:    1. Strain of lumbar region, initial encounter (Primary)  -     methylPREDNISolone (Medrol) 4 MG dose pack; follow package directions  Dispense: 21 tablet; Refill: 0  -     cyclobenzaprine (FLEXERIL) 10 MG tablet; Take 1 tablet by mouth 2 (Two) Times a Day As Needed for Muscle Spasms.  Dispense: 30 tablet; Refill: 2    2. Primary " insomnia  -     traZODone (DESYREL) 150 MG tablet; Take 0.5-2 tabs qhs prn sleep  Dispense: 60 tablet; Refill: 5

## 2021-09-14 ENCOUNTER — OFFICE VISIT (OUTPATIENT)
Dept: FAMILY MEDICINE CLINIC | Facility: CLINIC | Age: 41
End: 2021-09-14

## 2021-09-14 VITALS
WEIGHT: 171 LBS | HEIGHT: 68 IN | HEART RATE: 90 BPM | DIASTOLIC BLOOD PRESSURE: 78 MMHG | RESPIRATION RATE: 18 BRPM | TEMPERATURE: 97 F | SYSTOLIC BLOOD PRESSURE: 115 MMHG | BODY MASS INDEX: 25.91 KG/M2

## 2021-09-14 DIAGNOSIS — S39.012A STRAIN OF LUMBAR REGION, INITIAL ENCOUNTER: Primary | ICD-10-CM

## 2021-09-14 DIAGNOSIS — F51.01 PRIMARY INSOMNIA: ICD-10-CM

## 2021-09-14 PROCEDURE — 99214 OFFICE O/P EST MOD 30 MIN: CPT | Performed by: FAMILY MEDICINE

## 2021-09-14 RX ORDER — METHYLPREDNISOLONE 4 MG/1
TABLET ORAL
Qty: 21 TABLET | Refills: 0 | Status: SHIPPED | OUTPATIENT
Start: 2021-09-14 | End: 2022-07-02 | Stop reason: HOSPADM

## 2021-09-14 RX ORDER — CYCLOBENZAPRINE HCL 10 MG
10 TABLET ORAL 2 TIMES DAILY PRN
Qty: 30 TABLET | Refills: 2 | Status: SHIPPED | OUTPATIENT
Start: 2021-09-14 | End: 2022-11-01

## 2021-09-14 RX ORDER — TRAZODONE HYDROCHLORIDE 150 MG/1
TABLET ORAL
Qty: 60 TABLET | Refills: 5 | Status: SHIPPED | OUTPATIENT
Start: 2021-09-14 | End: 2022-06-03

## 2022-06-02 DIAGNOSIS — F51.01 PRIMARY INSOMNIA: ICD-10-CM

## 2022-06-03 RX ORDER — TRAZODONE HYDROCHLORIDE 150 MG/1
TABLET ORAL
Qty: 60 TABLET | Refills: 0 | Status: SHIPPED | OUTPATIENT
Start: 2022-06-03 | End: 2022-07-02 | Stop reason: HOSPADM

## 2022-06-30 ENCOUNTER — ANESTHESIA EVENT (OUTPATIENT)
Dept: PERIOP | Facility: HOSPITAL | Age: 42
End: 2022-06-30

## 2022-06-30 ENCOUNTER — TELEPHONE (OUTPATIENT)
Dept: FAMILY MEDICINE CLINIC | Facility: CLINIC | Age: 42
End: 2022-06-30

## 2022-06-30 ENCOUNTER — ANESTHESIA (OUTPATIENT)
Dept: PERIOP | Facility: HOSPITAL | Age: 42
End: 2022-06-30

## 2022-06-30 ENCOUNTER — HOSPITAL ENCOUNTER (OUTPATIENT)
Facility: HOSPITAL | Age: 42
Discharge: HOME | End: 2022-07-02
Attending: EMERGENCY MEDICINE | Admitting: UROLOGY

## 2022-06-30 DIAGNOSIS — N48.30 PRIAPISM: Primary | ICD-10-CM

## 2022-06-30 LAB
ALBUMIN SERPL-MCNC: 4.3 G/DL (ref 3.5–5.2)
ALBUMIN/GLOB SERPL: 2.3 G/DL
ALP SERPL-CCNC: 46 U/L (ref 39–117)
ALT SERPL W P-5'-P-CCNC: 22 U/L (ref 1–41)
ANION GAP SERPL CALCULATED.3IONS-SCNC: 13 MMOL/L (ref 5–15)
AST SERPL-CCNC: 28 U/L (ref 1–40)
BASOPHILS # BLD AUTO: 0.02 10*3/MM3 (ref 0–0.2)
BASOPHILS NFR BLD AUTO: 0.4 % (ref 0–1.5)
BILIRUB SERPL-MCNC: 0.9 MG/DL (ref 0–1.2)
BUN SERPL-MCNC: 7 MG/DL (ref 6–20)
BUN/CREAT SERPL: 8 (ref 7–25)
CALCIUM SPEC-SCNC: 8.5 MG/DL (ref 8.6–10.5)
CHLORIDE SERPL-SCNC: 98 MMOL/L (ref 98–107)
CO2 SERPL-SCNC: 27 MMOL/L (ref 22–29)
CREAT SERPL-MCNC: 0.88 MG/DL (ref 0.76–1.27)
DEPRECATED RDW RBC AUTO: 45.5 FL (ref 37–54)
EGFRCR SERPLBLD CKD-EPI 2021: 109.4 ML/MIN/1.73
EOSINOPHIL # BLD AUTO: 0.02 10*3/MM3 (ref 0–0.4)
EOSINOPHIL NFR BLD AUTO: 0.4 % (ref 0.3–6.2)
ERYTHROCYTE [DISTWIDTH] IN BLOOD BY AUTOMATED COUNT: 12.9 % (ref 12.3–15.4)
GLOBULIN UR ELPH-MCNC: 1.9 GM/DL
GLUCOSE SERPL-MCNC: 119 MG/DL (ref 65–99)
HCT VFR BLD AUTO: 36 % (ref 37.5–51)
HGB BLD-MCNC: 12.7 G/DL (ref 13–17.7)
LYMPHOCYTES # BLD AUTO: 1.4 10*3/MM3 (ref 0.7–3.1)
LYMPHOCYTES NFR BLD AUTO: 25.1 % (ref 19.6–45.3)
MCH RBC QN AUTO: 34.6 PG (ref 26.6–33)
MCHC RBC AUTO-ENTMCNC: 35.3 G/DL (ref 31.5–35.7)
MCV RBC AUTO: 98.1 FL (ref 79–97)
MONOCYTES # BLD AUTO: 0.43 10*3/MM3 (ref 0.1–0.9)
MONOCYTES NFR BLD AUTO: 7.7 % (ref 5–12)
NEUTROPHILS NFR BLD AUTO: 3.69 10*3/MM3 (ref 1.7–7)
NEUTROPHILS NFR BLD AUTO: 66.2 % (ref 42.7–76)
PLATELET # BLD AUTO: 143 10*3/MM3 (ref 140–450)
PMV BLD AUTO: 11.1 FL (ref 6–12)
POTASSIUM SERPL-SCNC: 3.6 MMOL/L (ref 3.5–5.2)
PROT SERPL-MCNC: 6.2 G/DL (ref 6–8.5)
RBC # BLD AUTO: 3.67 10*6/MM3 (ref 4.14–5.8)
SARS-COV-2 RNA PNL SPEC NAA+PROBE: NOT DETECTED
SODIUM SERPL-SCNC: 138 MMOL/L (ref 136–145)
WBC NRBC COR # BLD: 5.57 10*3/MM3 (ref 3.4–10.8)

## 2022-06-30 PROCEDURE — 25010000002 MIDAZOLAM PER 1 MG: Performed by: ANESTHESIOLOGY

## 2022-06-30 PROCEDURE — 25010000002 CEFAZOLIN 1-4 GM/50ML-% SOLUTION: Performed by: EMERGENCY MEDICINE

## 2022-06-30 PROCEDURE — 87635 SARS-COV-2 COVID-19 AMP PRB: CPT | Performed by: EMERGENCY MEDICINE

## 2022-06-30 PROCEDURE — 99284 EMERGENCY DEPT VISIT MOD MDM: CPT

## 2022-06-30 PROCEDURE — 96376 TX/PRO/DX INJ SAME DRUG ADON: CPT

## 2022-06-30 PROCEDURE — 0 LIDOCAINE 1 % SOLUTION: Performed by: EMERGENCY MEDICINE

## 2022-06-30 PROCEDURE — G0378 HOSPITAL OBSERVATION PER HR: HCPCS

## 2022-06-30 PROCEDURE — 36415 COLL VENOUS BLD VENIPUNCTURE: CPT

## 2022-06-30 PROCEDURE — 85014 HEMATOCRIT: CPT

## 2022-06-30 PROCEDURE — 96374 THER/PROPH/DIAG INJ IV PUSH: CPT

## 2022-06-30 PROCEDURE — 25010000002 FENTANYL CITRATE (PF) 50 MCG/ML SOLUTION: Performed by: ANESTHESIOLOGY

## 2022-06-30 PROCEDURE — 25010000002 HYDROMORPHONE 1 MG/ML SOLUTION: Performed by: EMERGENCY MEDICINE

## 2022-06-30 PROCEDURE — 25010000002 ONDANSETRON PER 1 MG: Performed by: EMERGENCY MEDICINE

## 2022-06-30 PROCEDURE — 82947 ASSAY GLUCOSE BLOOD QUANT: CPT

## 2022-06-30 PROCEDURE — 25010000002 ONDANSETRON PER 1 MG: Performed by: ANESTHESIOLOGY

## 2022-06-30 PROCEDURE — 96375 TX/PRO/DX INJ NEW DRUG ADDON: CPT

## 2022-06-30 PROCEDURE — C9803 HOPD COVID-19 SPEC COLLECT: HCPCS

## 2022-06-30 PROCEDURE — 80053 COMPREHEN METABOLIC PANEL: CPT | Performed by: EMERGENCY MEDICINE

## 2022-06-30 PROCEDURE — 25010000002 SUCCINYLCHOLINE PER 20 MG: Performed by: ANESTHESIOLOGY

## 2022-06-30 PROCEDURE — 85018 HEMOGLOBIN: CPT

## 2022-06-30 PROCEDURE — 85025 COMPLETE CBC W/AUTO DIFF WBC: CPT | Performed by: EMERGENCY MEDICINE

## 2022-06-30 PROCEDURE — 82803 BLOOD GASES ANY COMBINATION: CPT

## 2022-06-30 PROCEDURE — 25010000002 PROPOFOL 10 MG/ML EMULSION: Performed by: ANESTHESIOLOGY

## 2022-06-30 RX ORDER — ONDANSETRON 2 MG/ML
4 INJECTION INTRAMUSCULAR; INTRAVENOUS ONCE
Status: COMPLETED | OUTPATIENT
Start: 2022-06-30 | End: 2022-06-30

## 2022-06-30 RX ORDER — DOCUSATE SODIUM 100 MG/1
100 CAPSULE, LIQUID FILLED ORAL 2 TIMES DAILY
Status: DISCONTINUED | OUTPATIENT
Start: 2022-07-01 | End: 2022-07-02 | Stop reason: HOSPADM

## 2022-06-30 RX ORDER — PROPOFOL 10 MG/ML
VIAL (ML) INTRAVENOUS AS NEEDED
Status: DISCONTINUED | OUTPATIENT
Start: 2022-06-30 | End: 2022-06-30 | Stop reason: SURG

## 2022-06-30 RX ORDER — LIDOCAINE HYDROCHLORIDE 20 MG/ML
INJECTION, SOLUTION INFILTRATION; PERINEURAL AS NEEDED
Status: DISCONTINUED | OUTPATIENT
Start: 2022-06-30 | End: 2022-06-30 | Stop reason: SURG

## 2022-06-30 RX ORDER — PHENYLEPHRINE HCL IN 0.9% NACL 1 MG/10 ML
200 SYRINGE (ML) INTRAVENOUS
Status: DISCONTINUED | OUTPATIENT
Start: 2022-06-30 | End: 2022-07-02 | Stop reason: HOSPADM

## 2022-06-30 RX ORDER — PHENYLEPHRINE HCL IN 0.9% NACL 0.5 MG/5ML
2 SYRINGE (ML) INTRAVENOUS
Status: DISCONTINUED | OUTPATIENT
Start: 2022-06-30 | End: 2022-06-30

## 2022-06-30 RX ORDER — HYDROMORPHONE HYDROCHLORIDE 1 MG/ML
0.5 INJECTION, SOLUTION INTRAMUSCULAR; INTRAVENOUS; SUBCUTANEOUS
Status: DISCONTINUED | OUTPATIENT
Start: 2022-06-30 | End: 2022-07-02 | Stop reason: HOSPADM

## 2022-06-30 RX ORDER — ONDANSETRON 2 MG/ML
INJECTION INTRAMUSCULAR; INTRAVENOUS AS NEEDED
Status: DISCONTINUED | OUTPATIENT
Start: 2022-06-30 | End: 2022-06-30 | Stop reason: SURG

## 2022-06-30 RX ORDER — CEFAZOLIN SODIUM 1 G/50ML
1 INJECTION, SOLUTION INTRAVENOUS ONCE
Status: COMPLETED | OUTPATIENT
Start: 2022-06-30 | End: 2022-07-01

## 2022-06-30 RX ORDER — SODIUM CHLORIDE, SODIUM LACTATE, POTASSIUM CHLORIDE, CALCIUM CHLORIDE 600; 310; 30; 20 MG/100ML; MG/100ML; MG/100ML; MG/100ML
INJECTION, SOLUTION INTRAVENOUS CONTINUOUS PRN
Status: DISCONTINUED | OUTPATIENT
Start: 2022-06-30 | End: 2022-06-30 | Stop reason: SURG

## 2022-06-30 RX ORDER — FENTANYL CITRATE 50 UG/ML
INJECTION, SOLUTION INTRAMUSCULAR; INTRAVENOUS AS NEEDED
Status: DISCONTINUED | OUTPATIENT
Start: 2022-06-30 | End: 2022-06-30 | Stop reason: SURG

## 2022-06-30 RX ORDER — POLYETHYLENE GLYCOL 3350 17 G/17G
17 POWDER, FOR SOLUTION ORAL DAILY
Status: DISCONTINUED | OUTPATIENT
Start: 2022-07-01 | End: 2022-07-02 | Stop reason: HOSPADM

## 2022-06-30 RX ORDER — LIDOCAINE HYDROCHLORIDE 10 MG/ML
10 INJECTION, SOLUTION INFILTRATION; PERINEURAL ONCE
Status: COMPLETED | OUTPATIENT
Start: 2022-06-30 | End: 2022-06-30

## 2022-06-30 RX ORDER — ONDANSETRON 2 MG/ML
4 INJECTION INTRAMUSCULAR; INTRAVENOUS EVERY 4 HOURS PRN
Status: DISCONTINUED | OUTPATIENT
Start: 2022-06-30 | End: 2022-07-02 | Stop reason: HOSPADM

## 2022-06-30 RX ORDER — SUCCINYLCHOLINE CHLORIDE 20 MG/ML
INJECTION INTRAMUSCULAR; INTRAVENOUS AS NEEDED
Status: DISCONTINUED | OUTPATIENT
Start: 2022-06-30 | End: 2022-06-30 | Stop reason: SURG

## 2022-06-30 RX ORDER — MIDAZOLAM HYDROCHLORIDE 1 MG/ML
INJECTION INTRAMUSCULAR; INTRAVENOUS AS NEEDED
Status: DISCONTINUED | OUTPATIENT
Start: 2022-06-30 | End: 2022-06-30 | Stop reason: SURG

## 2022-06-30 RX ORDER — LIDOCAINE HYDROCHLORIDE 20 MG/ML
INJECTION, SOLUTION INFILTRATION; PERINEURAL AS NEEDED
Status: DISCONTINUED | OUTPATIENT
Start: 2022-06-30 | End: 2022-06-30 | Stop reason: HOSPADM

## 2022-06-30 RX ORDER — SODIUM CHLORIDE, SODIUM LACTATE, POTASSIUM CHLORIDE, CALCIUM CHLORIDE 600; 310; 30; 20 MG/100ML; MG/100ML; MG/100ML; MG/100ML
75 INJECTION, SOLUTION INTRAVENOUS CONTINUOUS
Status: DISCONTINUED | OUTPATIENT
Start: 2022-07-01 | End: 2022-07-02 | Stop reason: HOSPADM

## 2022-06-30 RX ORDER — LIDOCAINE HYDROCHLORIDE AND EPINEPHRINE 10; 10 MG/ML; UG/ML
10 INJECTION, SOLUTION INFILTRATION; PERINEURAL ONCE
Status: COMPLETED | OUTPATIENT
Start: 2022-06-30 | End: 2022-06-30

## 2022-06-30 RX ORDER — CEFAZOLIN SODIUM 1 G/50ML
1 INJECTION, SOLUTION INTRAVENOUS EVERY 8 HOURS
Status: DISCONTINUED | OUTPATIENT
Start: 2022-07-01 | End: 2022-07-02 | Stop reason: HOSPADM

## 2022-06-30 RX ORDER — MORPHINE SULFATE 2 MG/ML
1 INJECTION, SOLUTION INTRAMUSCULAR; INTRAVENOUS
Status: DISCONTINUED | OUTPATIENT
Start: 2022-06-30 | End: 2022-07-02 | Stop reason: HOSPADM

## 2022-06-30 RX ORDER — EPHEDRINE SULFATE 50 MG/ML
INJECTION, SOLUTION INTRAVENOUS AS NEEDED
Status: DISCONTINUED | OUTPATIENT
Start: 2022-06-30 | End: 2022-06-30 | Stop reason: SURG

## 2022-06-30 RX ORDER — HYDROCODONE BITARTRATE AND ACETAMINOPHEN 7.5; 325 MG/1; MG/1
1 TABLET ORAL EVERY 6 HOURS PRN
Status: DISCONTINUED | OUTPATIENT
Start: 2022-06-30 | End: 2022-07-02 | Stop reason: HOSPADM

## 2022-06-30 RX ORDER — PHENYLEPHRINE HYDROCHLORIDE 10 MG/ML
200 INJECTION INTRAVENOUS
Status: DISCONTINUED | OUTPATIENT
Start: 2022-06-30 | End: 2022-06-30

## 2022-06-30 RX ORDER — ROCURONIUM BROMIDE 10 MG/ML
INJECTION, SOLUTION INTRAVENOUS AS NEEDED
Status: DISCONTINUED | OUTPATIENT
Start: 2022-06-30 | End: 2022-06-30 | Stop reason: SURG

## 2022-06-30 RX ADMIN — EPHEDRINE SULFATE 5 MG: 50 INJECTION INTRAVENOUS at 21:52

## 2022-06-30 RX ADMIN — FENTANYL CITRATE 50 MCG: 50 INJECTION INTRAMUSCULAR; INTRAVENOUS at 21:44

## 2022-06-30 RX ADMIN — SUCCINYLCHOLINE CHLORIDE 160 MG: 20 INJECTION, SOLUTION INTRAMUSCULAR; INTRAVENOUS; PARENTERAL at 21:44

## 2022-06-30 RX ADMIN — EPHEDRINE SULFATE 5 MG: 50 INJECTION INTRAVENOUS at 22:01

## 2022-06-30 RX ADMIN — LIDOCAINE HYDROCHLORIDE 20 ML: 10 INJECTION, SOLUTION INFILTRATION; PERINEURAL at 21:10

## 2022-06-30 RX ADMIN — SODIUM CHLORIDE, POTASSIUM CHLORIDE, SODIUM LACTATE AND CALCIUM CHLORIDE: 600; 310; 30; 20 INJECTION, SOLUTION INTRAVENOUS at 21:37

## 2022-06-30 RX ADMIN — ROCURONIUM BROMIDE 5 MG: 50 INJECTION INTRAVENOUS at 21:44

## 2022-06-30 RX ADMIN — CEFAZOLIN SODIUM 1 G: 1 INJECTION, SOLUTION INTRAVENOUS at 21:12

## 2022-06-30 RX ADMIN — LIDOCAINE HYDROCHLORIDE 80 MG: 20 INJECTION, SOLUTION INFILTRATION; PERINEURAL at 21:44

## 2022-06-30 RX ADMIN — HYDROMORPHONE HYDROCHLORIDE 1 MG: 1 INJECTION, SOLUTION INTRAMUSCULAR; INTRAVENOUS; SUBCUTANEOUS at 19:57

## 2022-06-30 RX ADMIN — MIDAZOLAM 2 MG: 1 INJECTION INTRAMUSCULAR; INTRAVENOUS at 21:39

## 2022-06-30 RX ADMIN — ONDANSETRON 4 MG: 2 INJECTION INTRAMUSCULAR; INTRAVENOUS at 22:21

## 2022-06-30 RX ADMIN — LIDOCAINE HYDROCHLORIDE,EPINEPHRINE BITARTRATE 40 ML: 10; .01 INJECTION, SOLUTION INFILTRATION; PERINEURAL at 21:10

## 2022-06-30 RX ADMIN — HYDROMORPHONE HYDROCHLORIDE 1 MG: 1 INJECTION, SOLUTION INTRAMUSCULAR; INTRAVENOUS; SUBCUTANEOUS at 21:01

## 2022-06-30 RX ADMIN — PROPOFOL 170 MG: 10 INJECTION, EMULSION INTRAVENOUS at 21:44

## 2022-06-30 RX ADMIN — ONDANSETRON 4 MG: 2 INJECTION INTRAMUSCULAR; INTRAVENOUS at 19:56

## 2022-07-01 ENCOUNTER — PATIENT MESSAGE (OUTPATIENT)
Dept: FAMILY MEDICINE CLINIC | Facility: CLINIC | Age: 42
End: 2022-07-01

## 2022-07-01 LAB
BASE EXCESS BLDA CALC-SCNC: 6 MMOL/L (ref -5–5)
CA-I BLDA-SCNC: ABNORMAL MMOL/L
CO2 BLDA-SCNC: 30 MMOL/L (ref 24–29)
GLUCOSE BLDC GLUCOMTR-MCNC: 108 MG/DL (ref 70–130)
HCO3 BLDA-SCNC: 28.9 MMOL/L (ref 22–26)
HCT VFR BLDA CALC: 40 % (ref 38–51)
HGB BLDA-MCNC: 13.6 G/DL (ref 12–17)
PCO2 BLDA: 36 MM HG (ref 35–45)
PH BLDA: 7.53 PH UNITS (ref 7.35–7.6)
PO2 BLDA: 123 MMHG (ref 80–105)
POTASSIUM BLDA-SCNC: 5 MMOL/L (ref 3.5–4.9)
SAO2 % BLDA: 99 % (ref 95–98)

## 2022-07-01 PROCEDURE — 25010000002 CEFAZOLIN 1-4 GM/50ML-% SOLUTION: Performed by: UROLOGY

## 2022-07-01 PROCEDURE — G0378 HOSPITAL OBSERVATION PER HR: HCPCS

## 2022-07-01 RX ADMIN — DOCUSATE SODIUM 100 MG: 100 CAPSULE, LIQUID FILLED ORAL at 21:02

## 2022-07-01 RX ADMIN — POLYETHYLENE GLYCOL 3350 17 G: 17 POWDER, FOR SOLUTION ORAL at 08:54

## 2022-07-01 RX ADMIN — HYDROCODONE BITARTRATE AND ACETAMINOPHEN 1 TABLET: 7.5; 325 TABLET ORAL at 00:58

## 2022-07-01 RX ADMIN — SODIUM CHLORIDE, POTASSIUM CHLORIDE, SODIUM LACTATE AND CALCIUM CHLORIDE 75 ML/HR: 600; 310; 30; 20 INJECTION, SOLUTION INTRAVENOUS at 00:58

## 2022-07-01 RX ADMIN — HYDROCODONE BITARTRATE AND ACETAMINOPHEN 1 TABLET: 7.5; 325 TABLET ORAL at 18:02

## 2022-07-01 RX ADMIN — CEFAZOLIN SODIUM 1 G: 1 INJECTION, SOLUTION INTRAVENOUS at 12:48

## 2022-07-01 RX ADMIN — HYDROCODONE BITARTRATE AND ACETAMINOPHEN 1 TABLET: 7.5; 325 TABLET ORAL at 07:05

## 2022-07-01 RX ADMIN — DOCUSATE SODIUM 100 MG: 100 CAPSULE, LIQUID FILLED ORAL at 08:57

## 2022-07-01 RX ADMIN — CEFAZOLIN SODIUM 1 G: 1 INJECTION, SOLUTION INTRAVENOUS at 06:12

## 2022-07-01 RX ADMIN — CEFAZOLIN SODIUM 1 G: 1 INJECTION, SOLUTION INTRAVENOUS at 21:02

## 2022-07-01 NOTE — PLAN OF CARE
Goal Outcome Evaluation:           Progress: improving  Outcome Evaluation: 43 y/o s/POD 1 priapism repair. Pt up ad elijah. Voiding per FC. Neuro checks WNL, no c/o numbness/tingling. Dsg removed per urologist and open to air. Pain managed via PO pills. PIV running Nacl 75cc/hr. IV abx given through shift. Needs met. VSS. RA. Educated pt on importance of utilizing ice. Plan to D/C home tomorrow once stable. Will CTM.

## 2022-07-01 NOTE — OP NOTE
Operative Note      Vince Lynne  42 y.o.  1980  male  [id number]      6/30/2022  Surgeon(s) and Role:     * Anthony Macias MD - Primary   Pre-operative Diagnosis: Priapism  Post-operative Diagnosis: Same  Complications:None  History: This a 42-year-old gentleman who takes trazodone daily for a sleep.  Developed a painful priapism at 6 AM this morning.  He has never had a priapism in the past.  He does not use any illicit drugs.  He is otherwise healthy and has no here history of blood disorders.  He waited until 5 PM this evening to come to the emergency room.  In the emergency room the ER physician irrigated his priapism with phenylephrine 4 times without success.  At the bedside in the emergency room I was able to inject 20 cc of lidocaine without epinephrine and a dorsal nerve block and ring block.  He received 1 g of Kefzol IV.  He received IV pain medication with Dilaudid.  I was able to irrigate his priapism with saline through 2 18-gauge large-bore needles in each cavernosal body.  I was able to obtain detumescence.  But this was only briefly.  I elected to take him the operating room emergently for a repeat irrigation and a distal shunt.  Patient understood the risk involved in a prolonged priapism and shunt surgeries for priapism.  He understood that he was high risk for both temporary and permanent impotence.  He understood that the priapism shunting and irrigation may fail.  He understood that he might need secondary procedures.  He accepted all the risks and he was taken to the operating room.    Description of procedure:  Procedure(s) and Anesthesia Type:     * PRIAPISM REPAIR - General  After consent was obtained the patient was taken to the operating room and placed supine on the operating room table.  After general anesthesia was administered he was prepped and draped in the standard fashion.  I again injected 10 cc of lidocaine without epinephrine and the dorsal nerve block.  I placed  a stay suture with a 3-0 silk suture on taper needle in his glans for traction.  I was able to place a 14-gauge large-bore needle in the base of his left absurdities right corporal body and I obtained a blood gas from this.  The pH was 7.5 PCO2 was 36 PO2 was 123.  Was able to obtain detumescence again but it again refilled with blood.  I performed a distal shunt with an 11 blade scalpel into his right corporal body at his glans.  Again I obtained detumescence did and refilled and I closed the stab incision with a 4-0 chromic suture.  I placed Milad and Coban along with a Cronin catheter on the field.  He was in extubated in the operating room taken recovery room stable.  Were going to admit him for observation give him IV fluids IV antibiotics pain medication and reevaluate him in the morning.  Specimens:      Estimated Blood Loss: 100cc  Anthony Macias MD  6/30/2022

## 2022-07-01 NOTE — PROGRESS NOTES
POD1 s/p takedown of priapism    Comfortable. Patient feels detumesced.     AVSS  Phallus: ecchymotic over penile shaft. 50% tumescence.    No new labs.    Plan:  - apply ice to penis  - serial exams  - no indication for intervention at this time

## 2022-07-01 NOTE — H&P
H&P Note  Patient Identification:  Name: Vince Lynne  Age: 42 y.o.  Sex: male  : 1980  MRN: [id number]   Chief Complaint: priapism  History of Present Illness:     1. Priapism - painful, started this am 0600, presented to the ER at 5p and ER physician attempted irrigation with phenylepherine  Takes trazadone for sleep  etoh abuse hx  Not   No children  No blood disorders  20cc lidocaine w/o epi injected as a dorsal nerve block and ring block at the bedside  1g kefzol iv  Iv pain meds per ER  rohan cavernosal irrigation with 18 gauge needles with complete detumenescence but lasting only briefly      Problem List:  There are no hospital problems to display for this patient.    Past Medical History:  Past Medical History:   Diagnosis Date    Anxiety     Inguinal hernia     Insomnia     Irritable bowel syndrome 10/25/2011    Obstructive sleep apnea 2015    NON COMPLIANT WITH CPAP     Past Surgical History:  Past Surgical History:   Procedure Laterality Date    COLONOSCOPY N/A 2014    Normal ileum, normal colon, non-bleeding internal hemorrhoids-Dr. Edy Oviedo    HERNIA REPAIR  2018    INGUINAL HERNIA REPAIR Left 2018    Procedure: BILATERAL INGUINAL HERNIA REPAIR LAPAROSCOPIC;  Surgeon: Kylah Alvarez MD;  Location: [facility] OR [facility];  Service: General    KNEE ARTHROSCOPY W/ MENISCECTOMY Left         UPPER GASTROINTESTINAL ENDOSCOPY N/A 2014    LA Grade B reflux esophagitis, normal stomach, normal examined duodenum-Dr. Edy Oviedo      Home Meds:  (Not in a hospital admission)    Current Meds:     Current Facility-Administered Medications:     phenylephrine (VARGAS-SYNEPHRINE) 10 mg in sodium chloride 0.9 % 39 mL IV syringe, , Injection, Q5 Min PRN, Brooks Rockwell MD    Phenylephrine HCl (Pressors) solution prefilled syringe 200 mcg, 200 mcg, Intracavernosal, Q5 Min PRN, Brooks Rockwell MD    Current Outpatient Medications:     cyclobenzaprine (FLEXERIL) 10 MG  tablet, Take 1 tablet by mouth 2 (Two) Times a Day As Needed for Muscle Spasms., Disp: 30 tablet, Rfl: 2    methylPREDNISolone (Medrol) 4 MG dose pack, follow package directions, Disp: 21 tablet, Rfl: 0    traZODone (DESYREL) 150 MG tablet, TAKE 1/2 TO 2 TABLETS BY MOUTH AT BEDTIME AS NEEDED FOR SLEEP, Disp: 60 tablet, Rfl: 0  Allergies:  No Known Allergies  Immunizations:  Immunization History   Administered Date(s) Administered    COVID-19 (PFIZER) PURPLE CAP 2021, 2021, 2021    Tdap 2012     Social History:   Social History     Tobacco Use    Smoking status: Never Smoker    Smokeless tobacco: Former User   Substance Use Topics    Alcohol use: Yes     Comment: WEEKENDS      Family History:  Family History   Problem Relation Age of Onset    Heart attack Mother     Diabetes Father     Malig Hyperthermia Neg Hx       Review of Systems  negative 12 point system review except:as stated in the hpi  Objective:  tMax 24 hrs: Temp (24hrs), Av.5 °F (36.9 °C), Min:98 °F (36.7 °C), Max:98.9 °F (37.2 °C)    Vitals Ranges:   Temp:  [98 °F (36.7 °C)-98.9 °F (37.2 °C)] 98 °F (36.7 °C)  Heart Rate:  [] 96  Resp:  [16-17] 17  BP: ()/(56-82) 121/56  Intake and Output Last 3 Shifts:   No intake/output data recorded.  Exam:  /56 (BP Location: Left arm, Patient Position: Lying)   Pulse 96   Temp 98 °F (36.7 °C) (Oral)   Resp 17   SpO2 95%   General Appearance:  Alert, cooperative, no distress, appears stated age   Head:  Normocephalic, without obvious abnormality, atraumatic   ENT:  Normal hearing, external inspection, ears and nose   Eyes Normal pupils/iris, external inspection, conjunctivae, eyelids   Back: No CVA tenderness   Respiratory: Normal effort, palpation, auscultation   CV: Normal auscultation, carotid pulses, no edema   Abdomen: No hernia, soft, nontender, no masses   : Priapism, penile eccymosis and edema secondary to bedside irrigation    Musculoskelteal: Normal  extremities, nails, digits   Skin: Normal skin color, texture, no rashes or lesion   Neurologic/psych: Normal orientation, mood, affect       Data Review:  CBC:   Results from last 7 days   Lab Units 06/30/22 2116   WBC 10*3/mm3 5.57   RBC 10*6/mm3 3.67*      Assessment:    * No active hospital problems. *    Priapism  Low flow   ischemic      Plan:  Irrigation with distal shunt    Risks= impotence(temporary and permanent)  Recurrence of the priapism  Need for secondary procedures  Infection  Bleeding  Anesthetic complications  Chronic and acute pain      Anthony Macias MD  6/30/2022

## 2022-07-02 ENCOUNTER — READMISSION MANAGEMENT (OUTPATIENT)
Dept: CALL CENTER | Facility: HOSPITAL | Age: 42
End: 2022-07-02

## 2022-07-02 VITALS
OXYGEN SATURATION: 96 % | HEIGHT: 68 IN | HEART RATE: 71 BPM | BODY MASS INDEX: 23.39 KG/M2 | WEIGHT: 154.32 LBS | TEMPERATURE: 97 F | SYSTOLIC BLOOD PRESSURE: 99 MMHG | DIASTOLIC BLOOD PRESSURE: 59 MMHG | RESPIRATION RATE: 16 BRPM

## 2022-07-02 PROCEDURE — 25010000002 CEFAZOLIN 1-4 GM/50ML-% SOLUTION: Performed by: UROLOGY

## 2022-07-02 PROCEDURE — G0378 HOSPITAL OBSERVATION PER HR: HCPCS

## 2022-07-02 RX ORDER — OXYCODONE HYDROCHLORIDE AND ACETAMINOPHEN 5; 325 MG/1; MG/1
1 TABLET ORAL EVERY 6 HOURS PRN
Qty: 10 TABLET | Refills: 0 | Status: SHIPPED | OUTPATIENT
Start: 2022-07-02 | End: 2022-11-01

## 2022-07-02 RX ADMIN — DOCUSATE SODIUM 100 MG: 100 CAPSULE, LIQUID FILLED ORAL at 09:37

## 2022-07-02 RX ADMIN — POLYETHYLENE GLYCOL 3350 17 G: 17 POWDER, FOR SOLUTION ORAL at 09:37

## 2022-07-02 RX ADMIN — CEFAZOLIN SODIUM 1 G: 1 INJECTION, SOLUTION INTRAVENOUS at 07:02

## 2022-07-02 RX ADMIN — HYDROCODONE BITARTRATE AND ACETAMINOPHEN 1 TABLET: 7.5; 325 TABLET ORAL at 06:10

## 2022-07-02 NOTE — DISCHARGE SUMMARY
Discharge summary patient is now postop day 2 following distal shunt for priapism.  Penis is soft pain has resolved Cronin catheter is in place draining clear yellow urine.  She is afebrile vital signs are stable.  Cronin catheter to be removed after patient voids will be discharged home with instructions to discontinue trazodone and to follow-up with Dr. Macias in several weeks.  He has been instructed that if a prolonged erection returns he should come back to the emergency department.  Is also been instructed refrain from sexual activity for the next several weeks.

## 2022-07-02 NOTE — OUTREACH NOTE
Prep Survey    Flowsheet Row Responses   [facility] patient discharged from? [place]   Is LACE score < 7 ? Yes   Emergency Room discharge w/ pulse ox? No   Eligibility [facility]   Date of Admission 06/30/22   Date of Discharge 07/02/22   Discharge Disposition Home or Self Care   Discharge diagnosis priapism w/ surgical intervention   Does the patient have one of the following disease processes/diagnoses(primary or secondary)? General Surgery   Does the patient have Home health ordered? No   Is there a DME ordered? No   Prep survey completed? Yes          SUMAN MILLAN - Registered Nurse

## 2022-07-02 NOTE — PLAN OF CARE
Stable. A/O. No s/s distress. POD#2 priapism repair. Penis is swollen, no discharge. Ice pack applied. IV Cefazolin q8H. PRN Norco for pain. Up ad elijah. S/E by Dr. Dennis w/ orders given to d/c evelina Cronin d/ghulam MORENO at 1pm. Plan to d/c home today.

## 2022-07-02 NOTE — PLAN OF CARE
Goal Outcome Evaluation:              Outcome Evaluation: Pt post priapism surgery,for discharge today to home, already voided and can go home, stable condition, discharge instructions given. Needs attended.

## 2022-07-04 NOTE — CASE MANAGEMENT/SOCIAL WORK
Case Management Discharge Note      Final Note: home         Selected Continued Care - Discharged on 7/2/2022 Admission date: 6/30/2022 - Discharge disposition: Home or Self Care    Destination    No services have been selected for the patient.              Durable Medical Equipment    No services have been selected for the patient.              Dialysis/Infusion    No services have been selected for the patient.              Home Medical Care    No services have been selected for the patient.              Therapy    No services have been selected for the patient.              Community Resources    No services have been selected for the patient.              Community & DME    No services have been selected for the patient.                  Transportation Services  Private: Car    Final Discharge Disposition Code: 01 - home or self-care

## 2022-07-05 ENCOUNTER — TRANSITIONAL CARE MANAGEMENT TELEPHONE ENCOUNTER (OUTPATIENT)
Dept: CALL CENTER | Facility: HOSPITAL | Age: 42
End: 2022-07-05

## 2022-07-05 NOTE — OUTREACH NOTE
Call Center TCM Note    Flowsheet Row Responses   [facility] patient discharged from? [place]   Does the patient have one of the following disease processes/diagnoses(primary or secondary)? General Surgery   TCM attempt successful? No  [no verbal release]   Unsuccessful attempts Attempt 1          Latasha Paulson RN    7/5/2022, 11:19 EDT

## 2022-07-05 NOTE — OUTREACH NOTE
Call Center TCM Note    Flowsheet Row Responses   [facility] patient discharged from? [place]   Does the patient have one of the following disease processes/diagnoses(primary or secondary)? General Surgery   TCM attempt successful? Yes   Call start time 1711   Call end time 1713   Discharge diagnosis priapism w/ surgical intervention   Meds reviewed with patient/caregiver? Yes   Is the patient having any side effects they believe may be caused by any medication additions or changes? No   Does the patient have all medications related to this admission filled (includes all antibiotics, pain medications, etc.) Yes   Is the patient taking all medications as directed (includes completed medication regime)? Yes   Does the patient have a follow up appointment scheduled with their surgeon? No   What is preventing the patient from scheduling follow up appointments? Haven't had time   Nursing Interventions Advised patient to make appointment   Has the patient kept scheduled appointments due by today? N/A   Comments He prefers to call to schedule his appt with PCP after he checks his schedule   Has home health visited the patient within 72 hours of discharge? N/A   Psychosocial issues? No   Did the patient receive a copy of their discharge instructions? Yes   Nursing interventions Reviewed instructions with patient   What is the patient's perception of their health status since discharge? Improving  [Reports sore soreness, no issues with urination, swelling or fever]   Nursing interventions Nurse provided patient education  [Encouraged to review AVS for surgical precautions--]   Is the patient /caregiver able to teach back basic post-op care? Lifting as instructed by MD in discharge instructions   Is the patient/caregiver able to teach back signs and symptoms of incisional infection? Fever   Is the patient/caregiver able to teach back steps to recovery at home? Rest and rebuild strength, gradually increase activity    Is the patient/caregiver able to teach back the hierarchy of who to call/visit for symptoms/problems? PCP, Specialist, Home health nurse, Urgent Care, ED, 911 Yes   TCM call completed? Yes          Latasha Paulson RN    7/5/2022, 17:16 EDT

## 2022-11-01 ENCOUNTER — APPOINTMENT (OUTPATIENT)
Dept: GENERAL RADIOLOGY | Facility: HOSPITAL | Age: 42
End: 2022-11-01

## 2022-11-01 ENCOUNTER — APPOINTMENT (OUTPATIENT)
Dept: CT IMAGING | Facility: HOSPITAL | Age: 42
End: 2022-11-01

## 2022-11-01 ENCOUNTER — HOSPITAL ENCOUNTER (EMERGENCY)
Facility: HOSPITAL | Age: 42
Discharge: HOME | End: 2022-11-01
Attending: EMERGENCY MEDICINE | Admitting: EMERGENCY MEDICINE

## 2022-11-01 VITALS
HEART RATE: 81 BPM | TEMPERATURE: 98.5 F | SYSTOLIC BLOOD PRESSURE: 138 MMHG | HEIGHT: 66 IN | WEIGHT: 165 LBS | RESPIRATION RATE: 16 BRPM | DIASTOLIC BLOOD PRESSURE: 91 MMHG | OXYGEN SATURATION: 98 % | BODY MASS INDEX: 26.52 KG/M2

## 2022-11-01 DIAGNOSIS — R07.89 CHEST PAIN, ATYPICAL: Primary | ICD-10-CM

## 2022-11-01 DIAGNOSIS — R91.8 PULMONARY NODULES: ICD-10-CM

## 2022-11-01 LAB
ALBUMIN SERPL-MCNC: 4.7 G/DL (ref 3.5–5.2)
ALBUMIN/GLOB SERPL: 2 G/DL
ALP SERPL-CCNC: 59 U/L (ref 39–117)
ALT SERPL W P-5'-P-CCNC: 22 U/L (ref 1–41)
ANION GAP SERPL CALCULATED.3IONS-SCNC: 18 MMOL/L (ref 5–15)
AST SERPL-CCNC: 38 U/L (ref 1–40)
BASOPHILS # BLD AUTO: 0.04 10*3/MM3 (ref 0–0.2)
BASOPHILS NFR BLD AUTO: 1.1 % (ref 0–1.5)
BILIRUB SERPL-MCNC: 0.6 MG/DL (ref 0–1.2)
BUN SERPL-MCNC: 13 MG/DL (ref 6–20)
BUN/CREAT SERPL: 12.3 (ref 7–25)
CALCIUM SPEC-SCNC: 8.8 MG/DL (ref 8.6–10.5)
CHLORIDE SERPL-SCNC: 97 MMOL/L (ref 98–107)
CO2 SERPL-SCNC: 24 MMOL/L (ref 22–29)
CREAT SERPL-MCNC: 1.06 MG/DL (ref 0.76–1.27)
D DIMER PPP FEU-MCNC: 1.34 MCGFEU/ML (ref 0–0.49)
DEPRECATED RDW RBC AUTO: 49.3 FL (ref 37–54)
EGFRCR SERPLBLD CKD-EPI 2021: 89.3 ML/MIN/1.73
EOSINOPHIL # BLD AUTO: 0.01 10*3/MM3 (ref 0–0.4)
EOSINOPHIL NFR BLD AUTO: 0.3 % (ref 0.3–6.2)
ERYTHROCYTE [DISTWIDTH] IN BLOOD BY AUTOMATED COUNT: 14.4 % (ref 12.3–15.4)
GLOBULIN UR ELPH-MCNC: 2.3 GM/DL
GLUCOSE SERPL-MCNC: 114 MG/DL (ref 65–99)
HCT VFR BLD AUTO: 45.4 % (ref 37.5–51)
HGB BLD-MCNC: 16 G/DL (ref 13–17.7)
HOLD SPECIMEN: NORMAL
HOLD SPECIMEN: NORMAL
IMM GRANULOCYTES # BLD AUTO: 0.01 10*3/MM3 (ref 0–0.05)
IMM GRANULOCYTES NFR BLD AUTO: 0.3 % (ref 0–0.5)
LYMPHOCYTES # BLD AUTO: 1.46 10*3/MM3 (ref 0.7–3.1)
LYMPHOCYTES NFR BLD AUTO: 38.9 % (ref 19.6–45.3)
MCH RBC QN AUTO: 33.1 PG (ref 26.6–33)
MCHC RBC AUTO-ENTMCNC: 35.2 G/DL (ref 31.5–35.7)
MCV RBC AUTO: 93.8 FL (ref 79–97)
MONOCYTES # BLD AUTO: 0.38 10*3/MM3 (ref 0.1–0.9)
MONOCYTES NFR BLD AUTO: 10.1 % (ref 5–12)
NEUTROPHILS NFR BLD AUTO: 1.85 10*3/MM3 (ref 1.7–7)
NEUTROPHILS NFR BLD AUTO: 49.3 % (ref 42.7–76)
NRBC BLD AUTO-RTO: 0 /100 WBC (ref 0–0.2)
NT-PROBNP SERPL-MCNC: <5 PG/ML (ref 0–450)
PLATELET # BLD AUTO: 179 10*3/MM3 (ref 140–450)
PMV BLD AUTO: 10.7 FL (ref 6–12)
POTASSIUM SERPL-SCNC: 3.7 MMOL/L (ref 3.5–5.2)
PROT SERPL-MCNC: 7 G/DL (ref 6–8.5)
QT INTERVAL: 392 MS
RBC # BLD AUTO: 4.84 10*6/MM3 (ref 4.14–5.8)
SODIUM SERPL-SCNC: 139 MMOL/L (ref 136–145)
TROPONIN T SERPL-MCNC: <0.01 NG/ML (ref 0–0.03)
TROPONIN T SERPL-MCNC: <0.01 NG/ML (ref 0–0.03)
WBC NRBC COR # BLD: 3.75 10*3/MM3 (ref 3.4–10.8)
WHOLE BLOOD HOLD COAG: NORMAL
WHOLE BLOOD HOLD SPECIMEN: NORMAL

## 2022-11-01 PROCEDURE — 85025 COMPLETE CBC W/AUTO DIFF WBC: CPT | Performed by: EMERGENCY MEDICINE

## 2022-11-01 PROCEDURE — 84484 ASSAY OF TROPONIN QUANT: CPT | Performed by: EMERGENCY MEDICINE

## 2022-11-01 PROCEDURE — 0 IOPAMIDOL PER 1 ML: Performed by: EMERGENCY MEDICINE

## 2022-11-01 PROCEDURE — 83880 ASSAY OF NATRIURETIC PEPTIDE: CPT | Performed by: EMERGENCY MEDICINE

## 2022-11-01 PROCEDURE — 99284 EMERGENCY DEPT VISIT MOD MDM: CPT

## 2022-11-01 PROCEDURE — 80053 COMPREHEN METABOLIC PANEL: CPT | Performed by: EMERGENCY MEDICINE

## 2022-11-01 PROCEDURE — 71275 CT ANGIOGRAPHY CHEST: CPT

## 2022-11-01 PROCEDURE — 93010 ELECTROCARDIOGRAM REPORT: CPT | Performed by: INTERNAL MEDICINE

## 2022-11-01 PROCEDURE — 96360 HYDRATION IV INFUSION INIT: CPT

## 2022-11-01 PROCEDURE — 71045 X-RAY EXAM CHEST 1 VIEW: CPT

## 2022-11-01 PROCEDURE — 36415 COLL VENOUS BLD VENIPUNCTURE: CPT

## 2022-11-01 PROCEDURE — 93005 ELECTROCARDIOGRAM TRACING: CPT

## 2022-11-01 PROCEDURE — 93005 ELECTROCARDIOGRAM TRACING: CPT | Performed by: EMERGENCY MEDICINE

## 2022-11-01 PROCEDURE — 85379 FIBRIN DEGRADATION QUANT: CPT | Performed by: EMERGENCY MEDICINE

## 2022-11-01 RX ORDER — ASPIRIN 325 MG
325 TABLET ORAL ONCE
Status: COMPLETED | OUTPATIENT
Start: 2022-11-01 | End: 2022-11-01

## 2022-11-01 RX ORDER — LIDOCAINE HYDROCHLORIDE 20 MG/ML
15 SOLUTION OROPHARYNGEAL ONCE
Status: COMPLETED | OUTPATIENT
Start: 2022-11-01 | End: 2022-11-01

## 2022-11-01 RX ORDER — SODIUM CHLORIDE 0.9 % (FLUSH) 0.9 %
10 SYRINGE (ML) INJECTION AS NEEDED
Status: DISCONTINUED | OUTPATIENT
Start: 2022-11-01 | End: 2022-11-01 | Stop reason: HOSPADM

## 2022-11-01 RX ORDER — SODIUM CHLORIDE 9 MG/ML
125 INJECTION, SOLUTION INTRAVENOUS CONTINUOUS
Status: DISCONTINUED | OUTPATIENT
Start: 2022-11-01 | End: 2022-11-01 | Stop reason: HOSPADM

## 2022-11-01 RX ORDER — ALUMINA, MAGNESIA, AND SIMETHICONE 2400; 2400; 240 MG/30ML; MG/30ML; MG/30ML
15 SUSPENSION ORAL ONCE
Status: COMPLETED | OUTPATIENT
Start: 2022-11-01 | End: 2022-11-01

## 2022-11-01 RX ORDER — PANTOPRAZOLE SODIUM 40 MG/1
40 TABLET, DELAYED RELEASE ORAL DAILY
Qty: 14 TABLET | Refills: 0 | Status: SHIPPED | OUTPATIENT
Start: 2022-11-01 | End: 2022-11-15

## 2022-11-01 RX ADMIN — LIDOCAINE HYDROCHLORIDE 15 ML: 20 SOLUTION ORAL; TOPICAL at 08:16

## 2022-11-01 RX ADMIN — IOPAMIDOL 95 ML: 755 INJECTION, SOLUTION INTRAVENOUS at 12:07

## 2022-11-01 RX ADMIN — ALUMINUM HYDROXIDE, MAGNESIUM HYDROXIDE, AND DIMETHICONE 15 ML: 400; 400; 40 SUSPENSION ORAL at 08:15

## 2022-11-01 RX ADMIN — ASPIRIN 325 MG: 325 TABLET ORAL at 08:13

## 2022-11-01 RX ADMIN — SODIUM CHLORIDE 125 ML/HR: 9 INJECTION, SOLUTION INTRAVENOUS at 11:40

## 2022-11-01 NOTE — ED PROVIDER NOTES
" EMERGENCY DEPARTMENT ENCOUNTER    CHIEF COMPLAINT  Chief Complaint: Chest pain  History given by: Patient  History limited by: Nothing  Room Number: 11/11  PMD: Vito Ortiz MD      HPI:  Pt is a 42 y.o. male presents complaining of left anterior chest pain burning in nature, woke patient from sleep at 3 AM today.  Patient reports \"my chest was on fire\".  Patient reports the discomfort was improved with drinking some water.  Patient reports associated nausea, denies vomiting, reports he has felt short of air this morning.  Patient denies cough, fever, abdominal pain, changes in urinary or bowel habits, blood or black color to stool, swelling of extremities.  Patient denies personal history of hypertension, hyperlipidemia, diabetes, reports he does not smoke tobacco or use illicit drugs.  Patient reports his mother had an MI greater than the age of 55.  Patient denies previous history of cardiac testing.      Duration: 5 hours  Associated Symptoms: Short of air, nausea  Aggravating Factors: Nothing  Alleviating Factors: Drinking water  Treatment before arrival: Nothing    PAST MEDICAL HISTORY  Active Ambulatory Problems     Diagnosis Date Noted   • Impaired fasting glucose 06/30/2014   • Insomnia    • Irritable bowel syndrome 10/25/2011   • Obstructive sleep apnea 09/01/2015   • Lumbar radiculopathy 12/09/2016   • Left inguinal hernia 05/23/2018   • Moderate dehydration 04/09/2021   • Alcohol abuse 04/09/2021   • Alcohol intoxication (HCC) 04/09/2021   • Alcoholic hepatitis 04/09/2021   • Hypokalemia 04/10/2021   • Priapism 06/30/2022     Resolved Ambulatory Problems     Diagnosis Date Noted   • No Resolved Ambulatory Problems     Past Medical History:   Diagnosis Date   • Anxiety    • Inguinal hernia        PAST SURGICAL HISTORY  Past Surgical History:   Procedure Laterality Date   • COLONOSCOPY N/A 02/17/2014    Normal ileum, normal colon, non-bleeding internal hemorrhoids-Dr. Edy Oviedo   • HERNIA REPAIR  " 7/19/2018   • INGUINAL HERNIA REPAIR Left 7/19/2018    Procedure: BILATERAL INGUINAL HERNIA REPAIR LAPAROSCOPIC;  Surgeon: Kylah Alvarez MD;  Location: [facility];  Service: General   • KNEE ARTHROSCOPY W/ MENISCECTOMY Left     2006   • PRIAPISM REPAIR  6/30/2022    Procedure: PRIAPISM REPAIR;  Surgeon: Anthony Macias MD;  Location: [facility] OR;  Service: Urology;;   • UPPER GASTROINTESTINAL ENDOSCOPY N/A 02/17/2014    LA Grade B reflux esophagitis, normal stomach, normal examined duodenum-Dr. Edy Oviedo       FAMILY HISTORY  Family History   Problem Relation Age of Onset   • Heart attack Mother    • Diabetes Father    • Malig Hyperthermia Neg Hx        SOCIAL HISTORY  Social History     Socioeconomic History   • Marital status: Single   Tobacco Use   • Smoking status: Never   • Smokeless tobacco: Former   Substance and Sexual Activity   • Alcohol use: Yes     Comment: WEEKENDS   • Drug use: No   • Sexual activity: Yes     Partners: Female     Birth control/protection: Condom       ALLERGIES  Patient has no known allergies.    REVIEW OF SYSTEMS  Review of Systems   Constitutional: Negative for chills and fever.   HENT: Negative for sore throat and trouble swallowing.    Eyes: Negative for visual disturbance.   Respiratory: Positive for shortness of breath. Negative for cough.    Cardiovascular: Positive for chest pain. Negative for leg swelling.   Gastrointestinal: Positive for nausea. Negative for abdominal pain, diarrhea and vomiting.   Endocrine: Negative.    Genitourinary: Negative for decreased urine volume and frequency.   Musculoskeletal: Negative for neck pain.   Skin: Negative for rash.   Allergic/Immunologic: Negative.    Neurological: Negative for weakness and numbness.   Hematological: Negative.    Psychiatric/Behavioral: Negative.    All other systems reviewed and are negative.      PHYSICAL EXAM  ED Triage Vitals   Temp Heart Rate Resp BP SpO2   11/01/22 0628 11/01/22 0628 11/01/22 0628  11/01/22 0636 11/01/22 0628   98.5 °F (36.9 °C) 97 24 135/95 98 %      Temp src Heart Rate Source Patient Position BP Location FiO2 (%)   11/01/22 0628 11/01/22 0628 -- -- --   Tympanic Monitor          Physical Exam  Vitals and nursing note reviewed.   Constitutional:       General: He is in acute distress.   HENT:      Head: Normocephalic and atraumatic.   Eyes:      Extraocular Movements: EOM normal.   Cardiovascular:      Rate and Rhythm: Normal rate and regular rhythm.      Pulses:           Posterior tibial pulses are 2+ on the right side and 2+ on the left side.      Heart sounds: Normal heart sounds. No murmur heard.  Pulmonary:      Effort: Pulmonary effort is normal. No respiratory distress.      Breath sounds: Normal breath sounds. No wheezing.   Abdominal:      General: Bowel sounds are normal.      Palpations: Abdomen is soft.      Tenderness: There is no abdominal tenderness. There is no guarding or rebound.   Musculoskeletal:         General: No edema. Normal range of motion.      Cervical back: Normal range of motion.   Skin:     General: Skin is warm and dry.   Neurological:      Mental Status: He is alert and oriented to person, place, and time.   Psychiatric:         Mood and Affect: Affect normal.         LAB RESULTS  Lab Results (last 24 hours)     Procedure Component Value Units Date/Time    CBC & Differential [[id number]]  (Abnormal) Collected: 11/01/22 0649    Specimen: Blood from Arm, Right Updated: 11/01/22 0708    Narrative:      The following orders were created for panel order CBC & Differential.  Procedure                               Abnormality         Status                     ---------                               -----------         ------                     CBC Auto Differential[[id number]]        Abnormal            Final result                 Please view results for these tests on the individual orders.    Comprehensive Metabolic Panel [[id number]]  (Abnormal) Collected:  11/01/22 0649    Specimen: Blood from Arm, Right Updated: 11/01/22 0727     Glucose 114 mg/dL      BUN 13 mg/dL      Creatinine 1.06 mg/dL      Sodium 139 mmol/L      Potassium 3.7 mmol/L      Comment: Slight hemolysis detected by analyzer. Results may be affected.        Chloride 97 mmol/L      CO2 24.0 mmol/L      Calcium 8.8 mg/dL      Total Protein 7.0 g/dL      Albumin 4.70 g/dL      ALT (SGPT) 22 U/L      AST (SGOT) 38 U/L      Alkaline Phosphatase 59 U/L      Total Bilirubin 0.6 mg/dL      Globulin 2.3 gm/dL      A/G Ratio 2.0 g/dL      BUN/Creatinine Ratio 12.3     Anion Gap 18.0 mmol/L      eGFR 89.3 mL/min/1.73      Comment: National Kidney Foundation and American Society of Nephrology (ASN) Task Force recommended calculation based on the Chronic Kidney Disease Epidemiology Collaboration (CKD-EPI) equation refit without adjustment for race.       Narrative:      GFR Normal >60  Chronic Kidney Disease <60  Kidney Failure <15      BNP [[id number]]  (Normal) Collected: 11/01/22 0649    Specimen: Blood from Arm, Right Updated: 11/01/22 0725     proBNP <5.0 pg/mL     Narrative:      Among patients with dyspnea, NT-proBNP is highly sensitive for the detection of acute congestive heart failure. In addition NT-proBNP of <300 pg/ml effectively rules out acute congestive heart failure with 99% negative predictive value.    Results may be falsely decreased if patient taking Biotin.      Troponin [[id number]]  (Normal) Collected: 11/01/22 0649    Specimen: Blood from Arm, Right Updated: 11/01/22 0727     Troponin T <0.010 ng/mL     Narrative:      Troponin T Reference Range:  <= 0.03 ng/mL-   Negative for AMI  >0.03 ng/mL-     Abnormal for myocardial necrosis.  Clinicians would have to utilize clinical acumen, EKG, Troponin and serial changes to determine if it is an Acute Myocardial Infarction or myocardial injury due to an underlying chronic condition.       Results may be falsely decreased if patient taking Biotin.       CBC Auto Differential [[id number]]  (Abnormal) Collected: 11/01/22 0649    Specimen: Blood from Arm, Right Updated: 11/01/22 0708     WBC 3.75 10*3/mm3      RBC 4.84 10*6/mm3      Hemoglobin 16.0 g/dL      Hematocrit 45.4 %      MCV 93.8 fL      MCH 33.1 pg      MCHC 35.2 g/dL      RDW 14.4 %      RDW-SD 49.3 fl      MPV 10.7 fL      Platelets 179 10*3/mm3      Neutrophil % 49.3 %      Lymphocyte % 38.9 %      Monocyte % 10.1 %      Eosinophil % 0.3 %      Basophil % 1.1 %      Immature Grans % 0.3 %      Neutrophils, Absolute 1.85 10*3/mm3      Lymphocytes, Absolute 1.46 10*3/mm3      Monocytes, Absolute 0.38 10*3/mm3      Eosinophils, Absolute 0.01 10*3/mm3      Basophils, Absolute 0.04 10*3/mm3      Immature Grans, Absolute 0.01 10*3/mm3      nRBC 0.0 /100 WBC     D-dimer, Quantitative [[id number]]  (Abnormal) Collected: 11/01/22 0649    Specimen: Blood from Arm, Right Updated: 11/01/22 1051     D-Dimer, Quantitative 1.34 MCGFEU/mL     Narrative:      The Stago D-Dimer test used in conjunction with a clinical pretest probability (PTP) assessment model, has been approved by the FDA to rule out the presence of venous thromboembolism (VTE) in outpatients suspected of deep venous thrombosis (DVT) or pulmonary embolism (PE). The cut-off for negative predictive value is <0.50 MCGFEU/mL.    Troponin [[id number]]  (Normal) Collected: 11/01/22 0846    Specimen: Blood Updated: 11/01/22 0925     Troponin T <0.010 ng/mL     Narrative:      Troponin T Reference Range:  <= 0.03 ng/mL-   Negative for AMI  >0.03 ng/mL-     Abnormal for myocardial necrosis.  Clinicians would have to utilize clinical acumen, EKG, Troponin and serial changes to determine if it is an Acute Myocardial Infarction or myocardial injury due to an underlying chronic condition.       Results may be falsely decreased if patient taking Biotin.            I ordered the above labs and reviewed the results    RADIOLOGY  CT Angiogram Chest   Final Result   1.   No central pulmonary embolus.   2.  No acute cardiopulmonary process.   3.  Sub-6 mm right middle and left lower lobe nodules. If there are no   priors available to document stability one year follow-up recommended   particularly if this is a high-risk patient or smoker.   4.  Coronary artery calcifications particularly about the LAD.   5.  Please see above for additional findings/recommendations.       This report was finalized on 11/1/2022 12:48 PM by Dr. Ross Magdaleno M.D.          XR Chest 1 View   Final Result           I ordered the above noted radiological studies. Interpreted by radiologist. Viewed by me in PACS.       PROCEDURES  Procedures      PROGRESS AND CONSULTS  ED Course as of 11/01/22 1556   Tue Nov 01, 2022   1035 Patient and reports his left chest pain is much improved but now worse with deep breath, no other exacerbating factors. [TO]   1055 Patient family aware of D-dimer being positive, agreed to wait for CTA chest for further evaluation [TO]      ED Course User Index  [TO] Court Gandara MD     EKG          EKG time: 06 24   Rhythm/Rate: Sinus rhythm, rate in the 70s  P waves and [place]: Normal P waves, normal CHAIM's  QRS, axis: Poor R wave progression  ST and T waves: Unremarkable    Interpreted Contemporaneously by me, independently viewed  Minimally changed compared to prior 4/9/2021, heart rate now improved    Patient and mother at bedside are aware of patient's findings of pulmonary nodules and need for follow-up imaging for recheck, further testing, treatment as needed due to risk of abnormal growth of cell such as cancer which could be life-threatening or disabling.  Patient voices understanding of need for follow-up, agrees to do so with PCP and cardiology and to return to the emergency department immediately with persistent or worsening pain, shortness of air or other concerns.      Heart score 3    MEDICAL DECISION MAKING  Results were reviewed/discussed with the patient and they were also  made aware of online access. Pt also made aware that some labs, such as cultures, will not be resulted during ER visit and followup with PMD is necessary.       MDM       DIAGNOSIS  Final diagnoses:   Chest pain, atypical   Pulmonary nodules       DISPOSITION  DISCHARGE    Patient discharged in stable condition.    Reviewed implications of results, diagnosis, meds, responsibility to follow up, warning signs and symptoms of possible worsening, potential complications and reasons to return to ER    Patient/Family voiced understanding of above instructions.    Discussed plan for discharge, as there is no emergent indication for admission. Patient referred to primary care provider for BP management due to today's BP. Pt/family is agreeable and understands need for follow up and repeat testing.  Pt is aware that discharge does not mean that nothing is wrong but it indicates no emergency is present that requires admission and they must continue care with follow-up as given below or physician of their choice.     FOLLOW-UP  [facility] CARDIOLOGY  [address]  [address]  [place] [address] [phone]  Schedule an appointment as soon as possible for a visit in 3 days  EVEN IF WELL    Vito Ortiz MD  [address]  [place] [address]  [phone]    Schedule an appointment as soon as possible for a visit in 3 days  EVEN IF WELL         Medication List      New Prescriptions    pantoprazole 40 MG EC tablet  Commonly known as: PROTONIX  Take 1 tablet by mouth Daily for 14 days.           Where to Get Your Medications      These medications were sent to [id number] - [place], [place] - [address] - [phone] PH - [phone] FX  [address], [place] [address]    Phone: [phone]   · pantoprazole 40 MG EC tablet           Latest Documented Vital Signs:  As of 15:56 EDT  BP- 138/91 HR- 81 Temp- 98.5 °F (36.9 °C) (Tympanic) O2 sat-  98%    --  Patient was wearing facemask when I entered the room and throughout our encounter. Full protective equipment was worn throughout this patient encounter including a face mask, eye protection and gloves. Hand hygiene was performed before donning protective equipment and after removal when leaving the room.      Court Gandara MD  11/01/22 1557

## 2022-11-01 NOTE — DISCHARGE INSTRUCTIONS
You are advised to follow closely with Dr. Ortiz in [place] [facility] or cardiologist of your choice in 2-3 days for recheck, final results of lab work and imaging testing, and further testing/treatment as needed.    Please return to the emergency department immediately with chest pain persistent or worsening, shortness of air, abdominal pain, persistent vomiting/fever, blood in emesis or stool, lightheadedness/fainting, problems with speech, one sided weakness/numbness, new incontinence, problems with vision,  or for worsening of symptoms or other concerns.

## 2022-11-01 NOTE — ED TRIAGE NOTES
Patient to ED per PV w/ reports of chest pain, SOA that woke him from sleep at approx 0400. Patient denies pain radiation.

## 2024-05-31 ENCOUNTER — TELEPHONE (OUTPATIENT)
Dept: FAMILY MEDICINE CLINIC | Facility: CLINIC | Age: 44
End: 2024-05-31
Payer: MEDICAID

## 2024-05-31 NOTE — TELEPHONE ENCOUNTER
Left Nehemiah from [facility] a voicemail informing him that patient provider is out of the office today as well as his MA. Informed him that I would get with patient provider concerning paperwork and get back with him    HUB TO RELAY

## 2024-05-31 NOTE — TELEPHONE ENCOUNTER
Caller: SUSI RODRÍGUEZ WITH [facility]    Relationship:     Best call back number:     [phone]       What is the best time to reach you: ANYTIME 9-5     Who are you requesting to speak with (clinical staff, provider,  specific staff member): CLINICAL STAFF    Do you know the name of the person who called:      What was the call regarding:  SUSI IS CALLING TO CHECK ON A FAX THAT HE SENT ON 05/10/24 FOR THE PCP TO FILL OUT AND SIGN. SUSI STATES THAT HE RECEIVED THE FAX BACK, BUT THE QUESTIONS WAS NOT ANSWERED JUST SIGNED. PLEASE CONTACT SUSI TO ADVISE.     Is it okay if the provider responds through MyChart:            THANKS

## (undated) DEVICE — TROC SYS CANN HERN EZ PRT

## (undated) DEVICE — SUT VIC 5/0 PS2 18IN J495H

## (undated) DEVICE — BLUNT TIP TROCAR: Brand: AUTO SUTURE

## (undated) DEVICE — GLV SURG BIOGEL LTX PF 6 1/2

## (undated) DEVICE — OSC GEN LAPAROSCOPY: Brand: MEDLINE INDUSTRIES, INC.

## (undated) DEVICE — OVAL SHAPE BALLOON: Brand: EXTRA VIEW

## (undated) DEVICE — GOWN ,SIRUS,NONREINFORCED SMALL: Brand: MEDLINE

## (undated) DEVICE — SKIN AFFIX SURG ADHESIVE 72/CS 0.55ML: Brand: MEDLINE

## (undated) DEVICE — GLV SURG BIOGEL LTX PF 6

## (undated) DEVICE — SKIN PREP TRAY W/CHG: Brand: MEDLINE INDUSTRIES, INC.

## (undated) DEVICE — SUT VIC 0 TN 27IN DYED JTN0G